# Patient Record
Sex: FEMALE | Race: WHITE | Employment: UNEMPLOYED | URBAN - METROPOLITAN AREA
[De-identification: names, ages, dates, MRNs, and addresses within clinical notes are randomized per-mention and may not be internally consistent; named-entity substitution may affect disease eponyms.]

---

## 2022-06-16 ENCOUNTER — HOSPITAL ENCOUNTER (OUTPATIENT)
Age: 22
Setting detail: SPECIMEN
Discharge: HOME | End: 2022-06-16
Payer: MEDICARE

## 2022-06-16 ENCOUNTER — TELEPHONE (OUTPATIENT)
Dept: OBGYN CLINIC | Age: 22
End: 2022-06-16

## 2022-06-16 ENCOUNTER — OFFICE VISIT (OUTPATIENT)
Dept: OBGYN CLINIC | Age: 22
End: 2022-06-16
Payer: MEDICARE

## 2022-06-16 VITALS
HEART RATE: 70 BPM | HEIGHT: 64 IN | WEIGHT: 132 LBS | SYSTOLIC BLOOD PRESSURE: 112 MMHG | DIASTOLIC BLOOD PRESSURE: 64 MMHG | BODY MASS INDEX: 22.53 KG/M2

## 2022-06-16 DIAGNOSIS — Z87.59 HISTORY OF MISCARRIAGE: ICD-10-CM

## 2022-06-16 DIAGNOSIS — N92.6 MISSED MENSES: Primary | ICD-10-CM

## 2022-06-16 DIAGNOSIS — F12.10 TETRAHYDROCANNABINOL (THC) USE DISORDER, MILD, ABUSE: ICD-10-CM

## 2022-06-16 DIAGNOSIS — N93.9 VAGINA BLEEDING: ICD-10-CM

## 2022-06-16 DIAGNOSIS — R11.2 NAUSEA AND VOMITING, INTRACTABILITY OF VOMITING NOT SPECIFIED, UNSPECIFIED VOMITING TYPE: ICD-10-CM

## 2022-06-16 DIAGNOSIS — N92.6 MISSED MENSES: ICD-10-CM

## 2022-06-16 PROBLEM — A54.9 GONORRHEA: Status: ACTIVE | Noted: 2021-08-24

## 2022-06-16 PROBLEM — A74.9 CHLAMYDIAL INFECTION: Status: ACTIVE | Noted: 2021-08-24

## 2022-06-16 LAB — HCG QUANTITATIVE: ABNORMAL MIU/ML

## 2022-06-16 PROCEDURE — 84702 CHORIONIC GONADOTROPIN TEST: CPT

## 2022-06-16 PROCEDURE — 99203 OFFICE O/P NEW LOW 30 MIN: CPT | Performed by: NURSE PRACTITIONER

## 2022-06-16 PROCEDURE — 36415 COLL VENOUS BLD VENIPUNCTURE: CPT

## 2022-06-16 RX ORDER — VITAMIN A ACETATE, .BETA.-CAROTENE, ASCORBIC ACID, CHOLECALCIFEROL, .ALPHA.-TOCOPHEROL ACETATE, DL-, THIAMINE MONONITRATE, RIBOFLAVIN, NIACINAMIDE, PYRIDOXINE HYDROCHLORIDE, FOLIC ACID, CYANOCOBALAMIN, CALCIUM CARBONATE, FERROUS FUMARATE, ZINC OXIDE, AND CUPRIC OXIDE 2000; 2000; 120; 400; 22; 1.84; 3; 20; 10; 1; 12; 200; 27; 25; 2 [IU]/1; [IU]/1; MG/1; [IU]/1; MG/1; MG/1; MG/1; MG/1; MG/1; MG/1; UG/1; MG/1; MG/1; MG/1; MG/1
1 TABLET ORAL DAILY
Qty: 30 TABLET | Refills: 11 | Status: SHIPPED | OUTPATIENT
Start: 2022-06-16 | End: 2023-06-16

## 2022-06-16 RX ORDER — VITAMIN A ACETATE, .BETA.-CAROTENE, ASCORBIC ACID, CHOLECALCIFEROL, .ALPHA.-TOCOPHEROL ACETATE, DL-, THIAMINE MONONITRATE, RIBOFLAVIN, NIACINAMIDE, PYRIDOXINE HYDROCHLORIDE, FOLIC ACID, CYANOCOBALAMIN, CALCIUM CARBONATE, FERROUS FUMARATE, ZINC OXIDE, AND CUPRIC OXIDE 2000; 2000; 120; 400; 22; 1.84; 3; 20; 10; 1; 12; 200; 27; 25; 2 [IU]/1; [IU]/1; MG/1; [IU]/1; MG/1; MG/1; MG/1; MG/1; MG/1; MG/1; UG/1; MG/1; MG/1; MG/1; MG/1
1 TABLET ORAL DAILY
Qty: 30 TABLET | Refills: 12 | Status: SHIPPED | OUTPATIENT
Start: 2022-06-16 | End: 2022-07-20

## 2022-06-16 RX ORDER — PYRIDOXINE HCL (VITAMIN B6) 50 MG
1 TABLET ORAL 2 TIMES DAILY
Qty: 60 TABLET | Refills: 2 | Status: SHIPPED | OUTPATIENT
Start: 2022-06-16 | End: 2022-07-20

## 2022-06-16 NOTE — PROGRESS NOTES
 Ran Out of Food in the Last Year: Not on file   Transportation Needs:     Lack of Transportation (Medical): Not on file    Lack of Transportation (Non-Medical): Not on file   Physical Activity:     Days of Exercise per Week: Not on file    Minutes of Exercise per Session: Not on file   Stress:     Feeling of Stress : Not on file   Social Connections:     Frequency of Communication with Friends and Family: Not on file    Frequency of Social Gatherings with Friends and Family: Not on file    Attends Bahai Services: Not on file    Active Member of [address] [place] [address] or Organizations: Not on file    Attends Club or Organization Meetings: Not on file    Marital Status: Not on file   Intimate Partner Violence:     Fear of Current or Ex-Partner: Not on file    Emotionally Abused: Not on file    Physically Abused: Not on file    Sexually Abused: Not on file   Housing Stability:     Unable to Pay for Housing in the Last Year: Not on file    Number of [place] in the Last Year: Not on file    Unstable Housing in the Last Year: Not on file         MEDICATIONS:  Current Outpatient Medications   Medication Sig Dispense Refill    Prenatal Vit-Fe Fumarate-FA (PNV PRENATAL PLUS MULTIVITAMIN) 27-1 MG TABS Take 1 tablet by mouth daily 30 tablet 11    Pyridoxine HCl (B-6) 50 MG TABS Take 1 tablet by mouth 2 times daily 60 tablet 2    doxyLAMINE succinate (GNP SLEEP AID) 25 MG tablet Take 1 tablet by mouth nightly for 14 days 14 tablet 0    pantoprazole (PROTONIX) 40 MG tablet Take 1 tablet by mouth daily (Patient not taking: Reported on 6/16/2022) 14 tablet 0     No current facility-administered medications for this visit. ALLERGIES:  Allergies as of 06/16/2022    (No Known Allergies)         REVIEW OF SYSTEMS:    yes   A minimum of an eleven point review of systems was completed. Review Of Systems (11 point):  Constitutional: No fever, chills or malaise;  No weight change or fatigue  Head and Eyes: No vision, Headache, Dizziness or trauma in last 12 months  ENT ROS: No hearing, Tinnitis, sinus or taste problems  Hematological and Lymphatic ROS:No Lymphoma, Von Willebrand's, Hemophillia or Bleeding History  Psych ROS: No Depression, Homicidal thoughts,suicidal thoughts, or anxiety  Breast ROS: No prior breast abnormalities or lumps  Respiratory ROS: No SOB, Pneumoniae,Cough, or Pulmonary Embolism History  Cardiovascular ROS: No Chest Pain with Exertion, Palpitations, Syncope, Edema, Arrhythmia  Gastrointestinal ROS: No Indigestion, Heartburn, Nausea, vomiting, Diarrhea, Constipation,or Bowel Changes; No Bloody Stools or melena  Genito-Urinary ROS: No Dysuria, Hematuria or Nocturia. No Urinary Incontinence or Vaginal Discharge  Musculoskeletal ROS: No Arthralgia, Arthritis,Gout,Osteoporosis or Rheumatism  Neurological ROS: No CVA, Migraines, Epilepsy, Seizure Hx, or Limb Weakness  Dermatological ROS: No Rash, Itching, Hives, Mole Changes or Cancer          Blood pressure 112/64, pulse 70, height 5' 4\" (1.626 m), weight 132 lb (59.9 kg), not currently breastfeeding. Chaperone for Intimate Exam   Chaperone was offered and accepted as part of the rooming process.  Chaperone: Cheryl LUI         Abdomen: Soft non-tender; good bowel sounds. No guarding, rebound or rigidity. No CVA tenderness bilaterally. Extremities: No calf tenderness, DTR 2/4, and No edema bilaterally    Pelvic: Exam deferred. Diagnostics:  No results found. Lab Results:  Results for orders placed or performed during the hospital encounter of 07/29/16   Chlamydia/GC DNA, Urine   Result Value Ref Range    C. trachomatis DNA ,Urine NEGATIVE NEG    N. gonorrhoeae DNA, Urine NEGATIVE NEG         Assessment:   Diagnosis Orders   1. Missed menses  hCG, Quantitative, Pregnancy    CBC with Auto Differential    Prenatal type and screen   2.  Vagina bleeding       Patient Active Problem List    Diagnosis Date Noted    Chlamydial infection 08/24/2021     Priority: Medium    Gonorrhea 08/24/2021     Priority: Medium           PLAN:  Return in about 1 week (around 6/23/2022) for intake . Lab slip given  Rx PNVs, B6 and unisom  Abstain  U/s after + quant above 2000  Repeat Annual every 1 year  Cervical Cytology Evaluation begins at 23years old. If Negative Cytology, Follow-up screening per current guidelines. Return to the office in 1 weeks. Counseled on preventative health maintenance follow-up. Orders Placed This Encounter   Procedures    hCG, Quantitative, Pregnancy     Standing Status:   Future     Standing Expiration Date:   6/16/2023    CBC with Auto Differential     Standing Status:   Future     Standing Expiration Date:   6/16/2023    Prenatal type and screen     Standing Status:   Future     Standing Expiration Date:   6/16/2023           The patient, Silvana Singh is a 24 y.o. female, was seen with a total time spent of 30 minutes for the visit on this date of service by the E/M provider. The time component had both face to face and non face to face time spent in determining the total time component. Counseling and education regarding her diagnosis listed below and her options regarding those diagnoses were also included in determining her time component. Diagnosis Orders   1. Missed menses  hCG, Quantitative, Pregnancy    CBC with Auto Differential    Prenatal type and screen   2. Vagina bleeding          The patient had her preventative health maintenance recommendations and follow-up reviewed with her at the completion of her visit.

## 2022-06-16 NOTE — TELEPHONE ENCOUNTER
Per Alvin Draft NP, pt notified of + quant; unsure of LMP. RX for PNV to be sent to pt pharmacy. Call sent to [facility] to schedule new OB intake and US. Pt verbalized understanding.

## 2022-06-16 NOTE — TELEPHONE ENCOUNTER
----- Message from MONO Castillo NP sent at 6/16/2022  3:54 PM EDT -----  + Gumaro Melendez  Order PNVs  Patient will need scheduled for NOB

## 2022-07-06 ENCOUNTER — APPOINTMENT (OUTPATIENT)
Dept: GENERAL RADIOLOGY | Age: 22
End: 2022-07-06
Payer: MEDICARE

## 2022-07-06 ENCOUNTER — HOSPITAL ENCOUNTER (EMERGENCY)
Age: 22
Discharge: HOME | End: 2022-07-06
Attending: STUDENT IN AN ORGANIZED HEALTH CARE EDUCATION/TRAINING PROGRAM
Payer: MEDICARE

## 2022-07-06 VITALS
BODY MASS INDEX: 24.48 KG/M2 | WEIGHT: 133 LBS | OXYGEN SATURATION: 100 % | TEMPERATURE: 98.3 F | HEART RATE: 101 BPM | SYSTOLIC BLOOD PRESSURE: 120 MMHG | HEIGHT: 62 IN | DIASTOLIC BLOOD PRESSURE: 81 MMHG | RESPIRATION RATE: 16 BRPM

## 2022-07-06 DIAGNOSIS — O21.9 VOMITING DURING PREGNANCY: ICD-10-CM

## 2022-07-06 DIAGNOSIS — R07.9 CHEST PAIN, UNSPECIFIED TYPE: Primary | ICD-10-CM

## 2022-07-06 LAB
ABSOLUTE EOS #: 0.1 K/UL (ref 0–0.4)
ABSOLUTE LYMPH #: 2.2 K/UL (ref 1–4.8)
ABSOLUTE MONO #: 0.8 K/UL (ref 0.1–1.3)
ALBUMIN SERPL-MCNC: 4 G/DL (ref 3.5–5.2)
ALP BLD-CCNC: 65 U/L (ref 35–104)
ALT SERPL-CCNC: 32 U/L (ref 5–33)
ANION GAP SERPL CALCULATED.3IONS-SCNC: 11 MMOL/L (ref 9–17)
AST SERPL-CCNC: 18 U/L
BACTERIA: ABNORMAL
BASOPHILS # BLD: 1 % (ref 0–2)
BASOPHILS ABSOLUTE: 0.1 K/UL (ref 0–0.2)
BILIRUB SERPL-MCNC: 0.2 MG/DL (ref 0.3–1.2)
BILIRUBIN URINE: NEGATIVE
BUN BLDV-MCNC: 7 MG/DL (ref 6–20)
CALCIUM SERPL-MCNC: 9 MG/DL (ref 8.6–10.4)
CASTS UA: ABNORMAL /LPF
CHLORIDE BLD-SCNC: 104 MMOL/L (ref 98–107)
CO2: 22 MMOL/L (ref 20–31)
COLOR: YELLOW
CREAT SERPL-MCNC: <0.4 MG/DL (ref 0.5–0.9)
EOSINOPHILS RELATIVE PERCENT: 1 % (ref 0–4)
EPITHELIAL CELLS UA: ABNORMAL /HPF
GFR AFRICAN AMERICAN: ABNORMAL ML/MIN
GFR NON-AFRICAN AMERICAN: ABNORMAL ML/MIN
GFR SERPL CREATININE-BSD FRML MDRD: ABNORMAL ML/MIN/{1.73_M2}
GLUCOSE BLD-MCNC: 150 MG/DL (ref 70–99)
GLUCOSE URINE: ABNORMAL
HCG QUALITATIVE: POSITIVE
HCT VFR BLD CALC: 42.2 % (ref 36–46)
HEMOGLOBIN: 14.2 G/DL (ref 12–16)
KETONES, URINE: NEGATIVE
LEUKOCYTE ESTERASE, URINE: ABNORMAL
LYMPHOCYTES # BLD: 20 % (ref 24–44)
MAGNESIUM: 2 MG/DL (ref 1.6–2.6)
MCH RBC QN AUTO: 29 PG (ref 26–34)
MCHC RBC AUTO-ENTMCNC: 33.7 G/DL (ref 31–37)
MCV RBC AUTO: 86 FL (ref 80–100)
MONOCYTES # BLD: 7 % (ref 1–7)
NITRITE, URINE: NEGATIVE
PDW BLD-RTO: 14.4 % (ref 11.5–14.9)
PH UA: 6 (ref 5–8)
PLATELET # BLD: 247 K/UL (ref 150–450)
PMV BLD AUTO: 8.9 FL (ref 6–12)
POTASSIUM SERPL-SCNC: 3.5 MMOL/L (ref 3.7–5.3)
PROTEIN UA: NEGATIVE
RBC # BLD: 4.91 M/UL (ref 4–5.2)
RBC UA: ABNORMAL /HPF
SEG NEUTROPHILS: 71 % (ref 36–66)
SEGMENTED NEUTROPHILS ABSOLUTE COUNT: 8 K/UL (ref 1.3–9.1)
SODIUM BLD-SCNC: 137 MMOL/L (ref 135–144)
SPECIFIC GRAVITY UA: 1.02 (ref 1–1.03)
TOTAL PROTEIN: 6.5 G/DL (ref 6.4–8.3)
TROPONIN, HIGH SENSITIVITY: <6 NG/L (ref 0–14)
TURBIDITY: ABNORMAL
URINE HGB: NEGATIVE
UROBILINOGEN, URINE: NORMAL
WBC # BLD: 11.2 K/UL (ref 3.5–11)
WBC UA: ABNORMAL /HPF

## 2022-07-06 PROCEDURE — 80053 COMPREHEN METABOLIC PANEL: CPT

## 2022-07-06 PROCEDURE — 84484 ASSAY OF TROPONIN QUANT: CPT

## 2022-07-06 PROCEDURE — 93005 ELECTROCARDIOGRAM TRACING: CPT | Performed by: STUDENT IN AN ORGANIZED HEALTH CARE EDUCATION/TRAINING PROGRAM

## 2022-07-06 PROCEDURE — 87077 CULTURE AEROBIC IDENTIFY: CPT

## 2022-07-06 PROCEDURE — 87186 SC STD MICRODIL/AGAR DIL: CPT

## 2022-07-06 PROCEDURE — 87086 URINE CULTURE/COLONY COUNT: CPT

## 2022-07-06 PROCEDURE — 81001 URINALYSIS AUTO W/SCOPE: CPT

## 2022-07-06 PROCEDURE — 84703 CHORIONIC GONADOTROPIN ASSAY: CPT

## 2022-07-06 PROCEDURE — 83735 ASSAY OF MAGNESIUM: CPT

## 2022-07-06 PROCEDURE — 36415 COLL VENOUS BLD VENIPUNCTURE: CPT

## 2022-07-06 PROCEDURE — 85025 COMPLETE CBC W/AUTO DIFF WBC: CPT

## 2022-07-06 PROCEDURE — 99285 EMERGENCY DEPT VISIT HI MDM: CPT

## 2022-07-06 PROCEDURE — 71045 X-RAY EXAM CHEST 1 VIEW: CPT

## 2022-07-06 PROCEDURE — 6370000000 HC RX 637 (ALT 250 FOR IP): Performed by: STUDENT IN AN ORGANIZED HEALTH CARE EDUCATION/TRAINING PROGRAM

## 2022-07-06 RX ORDER — DOXYLAMINE SUCCINATE AND PYRIDOXINE HYDROCHLORIDE, DELAYED RELEASE TABLETS 10 MG/10 MG 10; 10 MG/1; MG/1
TABLET, DELAYED RELEASE ORAL
Qty: 60 TABLET | Refills: 0 | Status: SHIPPED | OUTPATIENT
Start: 2022-07-06

## 2022-07-06 RX ORDER — CEPHALEXIN 500 MG/1
500 CAPSULE ORAL 2 TIMES DAILY
Qty: 14 CAPSULE | Refills: 0 | Status: SHIPPED | OUTPATIENT
Start: 2022-07-06 | End: 2022-07-13

## 2022-07-06 RX ORDER — CALCIUM CARBONATE 200(500)MG
1 TABLET,CHEWABLE ORAL DAILY
Qty: 30 TABLET | Refills: 0 | Status: SHIPPED | OUTPATIENT
Start: 2022-07-06 | End: 2022-08-05

## 2022-07-06 RX ORDER — POTASSIUM CHLORIDE 20 MEQ/1
20 TABLET, EXTENDED RELEASE ORAL ONCE
Status: COMPLETED | OUTPATIENT
Start: 2022-07-06 | End: 2022-07-06

## 2022-07-06 RX ADMIN — POTASSIUM CHLORIDE 20 MEQ: 1500 TABLET, EXTENDED RELEASE ORAL at 23:19

## 2022-07-06 ASSESSMENT — PAIN DESCRIPTION - PAIN TYPE: TYPE: ACUTE PAIN

## 2022-07-06 ASSESSMENT — PAIN SCALES - GENERAL: PAINLEVEL_OUTOF10: 7

## 2022-07-06 ASSESSMENT — PAIN - FUNCTIONAL ASSESSMENT: PAIN_FUNCTIONAL_ASSESSMENT: 0-10

## 2022-07-06 ASSESSMENT — PAIN DESCRIPTION - ORIENTATION: ORIENTATION: MID

## 2022-07-06 ASSESSMENT — PAIN DESCRIPTION - LOCATION: LOCATION: CHEST

## 2022-07-07 LAB
EKG ATRIAL RATE: 75 BPM
EKG P AXIS: 38 DEGREES
EKG P-R INTERVAL: 122 MS
EKG Q-T INTERVAL: 364 MS
EKG QRS DURATION: 88 MS
EKG QTC CALCULATION (BAZETT): 406 MS
EKG R AXIS: 62 DEGREES
EKG T AXIS: 52 DEGREES
EKG VENTRICULAR RATE: 75 BPM

## 2022-07-07 PROCEDURE — 93010 ELECTROCARDIOGRAM REPORT: CPT | Performed by: INTERNAL MEDICINE

## 2022-07-07 ASSESSMENT — ENCOUNTER SYMPTOMS
SHORTNESS OF BREATH: 0
RHINORRHEA: 0
VOMITING: 1
SORE THROAT: 0
ABDOMINAL PAIN: 0
NAUSEA: 1
DIARRHEA: 0
COUGH: 0

## 2022-07-07 NOTE — ED PROVIDER NOTES
EMERGENCY DEPARTMENT ENCOUNTER   ATTENDING ATTESTATION     Pt Name: Cody Preston  MRN: [id number]  [place] 2000  Date of evaluation: 7/7/22       Cody Preston is a 24 y.o. female who presents with Chest Pain      MDM:   79-year-old female currently pregnant unknown dates presents for evaluation with burning chest discomfort. Symptoms present for the past several days. Symptoms are mild and intermittent. No other associated symptoms. Has been having some vomiting in the morning during early pregnancy. No shortness of breath. Work-up is unremarkable. Bedside ultrasound performed by resident showed a live IUP. We will have the patient follow-up with OB/GYN. No shortness of breath or hypoxia or respiratory complaints do not suspect PE.    EKG  Sinus rhythm rate of 75 normal axis normal intervals no concerning ST or T wave changes    Vitals:   Vitals:    07/06/22 2105   BP: 120/81   Pulse: (!) 101   Resp: 16   Temp: 98.3 °F (36.8 °C)   TempSrc: Oral   SpO2: 100%   Weight: 133 lb (60.3 kg)   Height: 5' 2\" (1.575 m)         I personally saw and examined the patient. I have reviewed and agree with the resident's findings, including all diagnostic interpretations and treatment plan as written. I was present for the key portions of any procedures performed and the inclusive time noted for any critical care statement. The care is provided during an unprecedented national emergency due to the novel coronavirus, COVID 19.   Radha Sullivan MD  Attending Emergency Physician            Radha Sullivan MD  07/07/22 9992

## 2022-07-07 NOTE — ED NOTES
Mode of arrival (squad #, walk in, police, etc) : walk in      Chief complaint(s): CP      Arrival Note (brief scenario, treatment PTA, etc). : c/o intermittent CP, 2-3 days. Midsternal, no radiation. Unsure if pain is r/t reflux or anxiety. (+) pregnancy 6/16, has pending outpt ultrasound order with Dr. Praveen Knox. Denies abd pain, cramping, discharge and any bleeding. [facility] unknown. No meds for pain PTA. C= \"Have you ever felt that you should Cut down on your drinking? \"  No  A= \"Have people Annoyed you by criticizing your drinking? \"  No  G= \"Have you ever felt bad or Guilty about your drinking? \"  No  E= \"Have you ever had a drink as an Eye-opener first thing in the morning to steady your nerves or to help a hangover? \"  No      Deferred []      Reason for deferring: N/A    *If yes to two or more: probable alcohol abuse. Alexx Hinojosa RN  07/06/22 8851

## 2022-07-07 NOTE — ED PROVIDER NOTES
[facility] ED  Emergency Department Encounter  EmergencyMedicine Resident     Pt Name:Emily Kenyon  MRN: [id number]  [place] 2000  Date of evaluation: 7/6/22  PCP:  Nathaly Castro MD    This patient was evaluated in the Emergency Department for symptoms described in the history of present illness. The patient was evaluated in the context of the global COVID-19 pandemic, which necessitated consideration that the patient might be at risk for infection with the SARS-CoV-2 virus that causes COVID-19. Institutional protocols and algorithms that pertain to the evaluation of patients at risk for COVID-19 are in a state of rapid change based on information released by regulatory bodies including the CDC and federal and state organizations. These policies and algorithms were followed during the patient's care in the ED. CHIEF COMPLAINT       Chief Complaint   Patient presents with    Chest Pain       HISTORY OF PRESENT ILLNESS  (Location/Symptom, Timing/Onset, Context/Setting, Quality, Duration, Modifying Factors, Severity.)      Emily Kenyon is a 24 y.o. female who presents with chest pain and nausea with vomiting. Patient recently found out she was pregnant, is uncertain how far along she is as she does not recall her last menstrual period was. Patient states she has had chest pain for the past 4 days, describes the pain as radiating from her right chest to her left chest, and states it feels like a burning sensation across her chest.  She denies any concurrent shortness of breath. She also endorses several episodes of vomiting in the morning for the past few weeks. She states she has a little nausea now, but has not vomited for the past 2 days. She states she has a history of anxiety, and thinks her chest pain may be due to anxiety, but would like to ensure it is not a cardiac problem. She has an OB appointment scheduled for 7/20 for her initial prenatal visit.   She has been taking prenatal vitamins daily. She has not taken any medication for chest pain or nausea. She denies headache, changes in vision, trauma, syncope or falls, shortness of breath, abdominal pain or cramping, current nausea, vaginal bleeding, vaginal discharge, fevers, chills. PAST MEDICAL / SURGICAL / SOCIAL / FAMILY HISTORY      has a past medical history of Anxiety, Depression, and Depression. has a past surgical history that includes Tonsillectomy and Tympanostomy tube placement. Social History     Socioeconomic History    Marital status: Single     Spouse name: Not on file    Number of children: Not on file    Years of education: Not on file    Highest education level: Not on file   Occupational History    Not on file   Tobacco Use    Smoking status: Never Smoker    Smokeless tobacco: Never Used   Vaping Use    Vaping Use: Some days    Substances: Nicotine    Devices: Disposable   Substance and Sexual Activity    Alcohol use: No    Drug use: Yes     Frequency: 7.0 times per week     Types: Marijuana Cintron Kassandra)    Sexual activity: Not on file   Other Topics Concern    Not on file   Social History Narrative    Not on file     Social Determinants of Health     Financial Resource Strain:     Difficulty of Paying Living Expenses: Not on file   Food Insecurity:     Worried About Running Out of Food in the Last Year: Not on file    Ozzie of Food in the Last Year: Not on file   Transportation Needs:     Lack of Transportation (Medical): Not on file    Lack of Transportation (Non-Medical):  Not on file   Physical Activity:     Days of Exercise per Week: Not on file    Minutes of Exercise per Session: Not on file   Stress:     Feeling of Stress : Not on file   Social Connections:     Frequency of Communication with Friends and Family: Not on file    Frequency of Social Gatherings with Friends and Family: Not on file    Attends Yazidi Services: Not on file   [employer] of [employer]s or Organizations: Not on file    Attends Club or Organization Meetings: Not on file    Marital Status: Not on file   Intimate Partner Violence:     Fear of Current or Ex-Partner: Not on file    Emotionally Abused: Not on file    Physically Abused: Not on file    Sexually Abused: Not on file   Housing Stability:     Unable to Pay for Housing in the Last Year: Not on file    Number of [place] in the Last Year: Not on file    Unstable Housing in the Last Year: Not on file       No family history on file. Allergies:  Patient has no known allergies. Home Medications:  Prior to Admission medications    Medication Sig Start Date End Date Taking? Authorizing Provider   doxylamine-pyridoxine 10-10 MG TBEC Take one tablet daily as needed for nausea 7/6/22  Yes Edgar Cooper MD   calcium carbonate (ANTACID) 500 MG chewable tablet Take 1 tablet by mouth daily 7/6/22 8/5/22 Yes Edgar Cooper MD   cephALEXin [facility]) 500 MG capsule Take 1 capsule by mouth 2 times daily for 7 days 7/6/22 7/13/22 Yes Edgar Cooper MD   Prenatal Vit-Fe Fumarate-FA (PNV PRENATAL PLUS MULTIVITAMIN) 27-1 MG TABS Take 1 tablet by mouth daily 6/16/22 6/16/23  Moris Mix APRN - NP   Pyridoxine HCl (B-6) 50 MG TABS Take 1 tablet by mouth 2 times daily  Patient not taking: Reported on 7/6/2022 6/16/22   Subramanian Imam, APRN - NP   Prenatal Vit-Fe Fumarate-FA (PNV PRENATAL PLUS MULTIVITAMIN) 27-1 MG TABS Take 1 tablet by mouth daily 6/16/22 7/16/22  Moris Mix APRN - NP   pantoprazole (PROTONIX) 40 MG tablet Take 1 tablet by mouth daily  Patient not taking: Reported on 6/16/2022 7/8/16   Regine Tran DO       REVIEW OF SYSTEMS    (2-9 systems for level 4, 10 or more for level 5)      Review of Systems   Constitutional: Negative for activity change, appetite change, chills, fatigue and fever. HENT: Negative for congestion, rhinorrhea and sore throat. Eyes: Negative for visual disturbance. Respiratory: Negative for cough and shortness of breath. Cardiovascular: Positive for chest pain (Right and left sided burning in her chest). Negative for palpitations and leg swelling. Gastrointestinal: Positive for nausea and vomiting (Only in the mornings, none for the past 2 days). Negative for abdominal pain and diarrhea. Genitourinary: Negative for dysuria and hematuria. Musculoskeletal: Negative for arthralgias and myalgias. Skin: Negative for pallor and rash. Neurological: Negative for dizziness, tremors, syncope, weakness, light-headedness, numbness and headaches. All other systems reviewed and are negative. PHYSICAL EXAM   (up to 7 for level 4, 8 or more for level 5)      INITIAL VITALS:   /81   Pulse (!) 101   Temp 98.3 °F (36.8 °C) (Oral)   Resp 16   Ht 5' 2\" (1.575 m)   Wt 133 lb (60.3 kg)   LMP  (LMP Unknown)   SpO2 100%   BMI 24.33 kg/m²     Physical Exam  Vitals reviewed. Constitutional:       General: She is not in acute distress. Appearance: She is not toxic-appearing. HENT:      Head: Normocephalic and atraumatic. Mouth/Throat:      Mouth: Mucous membranes are moist.      Pharynx: Oropharynx is clear. Eyes:      Extraocular Movements: Extraocular movements intact. Pupils: Pupils are equal, round, and reactive to light. Cardiovascular:      Rate and Rhythm: Normal rate and regular rhythm. Pulses: Normal pulses. Heart sounds: Normal heart sounds. Pulmonary:      Effort: Pulmonary effort is normal.      Breath sounds: Normal breath sounds. No decreased breath sounds, wheezing, rhonchi or rales. Abdominal:      Palpations: Abdomen is soft. Tenderness: There is no abdominal tenderness. There is no guarding or rebound. Musculoskeletal:         General: Normal range of motion. Cervical back: Normal range of motion and neck supple. Right lower leg: No edema. Left lower leg: No edema.    Skin:     Capillary Refill: Capillary refill takes less than 2 seconds. Coloration: Skin is not pale. Findings: No rash. Neurological:      General: No focal deficit present. Mental Status: She is alert and oriented to person, place, and time. Motor: No weakness. DIFFERENTIAL  DIAGNOSIS     PLAN (LABS / IMAGING / EKG):  Orders Placed This Encounter   Procedures    Culture, Urine    XR CHEST PORTABLE    Troponin    HCG Qualitative, Serum    CBC with Auto Differential    Comprehensive Metabolic Panel w/ Reflex to MG    Urinalysis with Microscopic    Magnesium    EKG 12 Lead       MEDICATIONS ORDERED:  Orders Placed This Encounter   Medications    potassium chloride (KLOR-CON M) extended release tablet 20 mEq    doxylamine-pyridoxine 10-10 MG TBEC     Sig: Take one tablet daily as needed for nausea     Dispense:  60 tablet     Refill:  0    calcium carbonate (ANTACID) 500 MG chewable tablet     Sig: Take 1 tablet by mouth daily     Dispense:  30 tablet     Refill:  0    cephALEXin (KEFLEX) 500 MG capsule     Sig: Take 1 capsule by mouth 2 times daily for 7 days     Dispense:  14 capsule     Refill:  0       DDX: Chest Pain Diferentials    Emergent causes: ACS/NSTEMI/STEMI/angina, arrhythmia, trauma, aortic dissection, PE, Pneumonia, pneumothorax, esophageal rupture, tamponade, Cocaine use    Non-emergent causes: pneumonia, pericarditis, GERD, MSK, Endocarditis, anxiety      As a part of this differential, I evaluated for the presence of diaphoresis,  chest pain (including the QRST of the chest pain), tachypnea, BP in both arms, heart sounds, JVD, chest wall tenderness, abnormal lung sounds, and the presence of systemic and peripheral edema. I also evaluated the HEART Risk score and it is documented below.     HEART Risk Score for Chest Pain Patients      History and Physical Exam Suspicion Level   · Nausea/Vomiting   · Diaphoresis   · Radiation   · Related to Exertion  · Quality of Pain     EKG Interpretation  · Normal (0 pts)  · Non-Specific Repolarization Disturbance (1 pt)  · Significant ST-Depression (2 pts)     Age of Patient (in years)  · = 45 (0 pts)  · 46-64 (1 pt)  · = 65 (2 pts)    Risk Factors (number present)  · Hypercholesterolemia  · Hypertension  · Diabetes Mellitus  · Cigarette smoking  · Positive family history  · Obesity  · CAD  · Automatically get 2 points for - SLE, CKDz, HIV, Cocaine abuse     Troponin Levels  · = Normal Limit (0 pts)  · 1-3 Times Normal Limit (1 pt)  · > 3 Times Normal Limit (2 pts)      H&P ECG  Patient Age Risk Factors Troponins   0   Slight Normal   45   None   Normal level   1   Moderate Non-specific    46-64   1-2 risk factors   1-3 x Normal   2   High ST Changes   65 or older   3+ risk factors   3+ x Normal   Total 0 0 0 0 0     TOTAL: 0    Percent Risk for Major Adverse Cardiac Event (MACE)  0-3 pts indicates low risk for MACE   2.5% (DISCHARGE)   4-7 pts indicates moderate risk for MACE  20.3% (OBS)  8-10 pts indicates high risk for MACE   72.7% (EARLY INVASIVE TX)      DIAGNOSTIC RESULTS / EMERGENCY DEPARTMENT COURSE / MDM   LAB RESULTS:  Results for orders placed or performed during the hospital encounter of 07/06/22   Troponin   Result Value Ref Range    Troponin, High Sensitivity <6 0 - 14 ng/L   HCG Qualitative, Serum   Result Value Ref Range    hCG Qual POSITIVE (A) NEGATIVE   CBC with Auto Differential   Result Value Ref Range    WBC 11.2 (H) 3.5 - 11.0 k/uL    RBC 4.91 4.0 - 5.2 m/uL    Hemoglobin 14.2 12.0 - 16.0 g/dL    Hematocrit 42.2 36 - 46 %    MCV 86.0 80 - 100 fL    MCH 29.0 26 - 34 pg    MCHC 33.7 31 - 37 g/dL    RDW 14.4 11.5 - 14.9 %    Platelets 166 715 - 626 k/uL    MPV 8.9 6.0 - 12.0 fL    Seg Neutrophils 71 (H) 36 - 66 %    Lymphocytes 20 (L) 24 - 44 %    Monocytes 7 1 - 7 %    Eosinophils % 1 0 - 4 %    Basophils 1 0 - 2 %    Segs Absolute 8.00 1.3 - 9.1 k/uL    Absolute Lymph # 2.20 1.0 - 4.8 k/uL    Absolute Mono # 0.80 0.1 - 1.3 k/uL has her initial prenatal visit scheduled for 7/20. EKG NSR. Troponin < 6. WBC 11.2 consistent with patient's reported dehydration. Glucose 150, discussed the need for early 1 hr GTT and prenatal follow ups with her OB to rule out gestational diabetes. K 3.5, replaced. CXR showing no acute cardiopulmonary abnormality. Lungs CTA, heart RRR, abdomen soft and nontender in all quadrants. Bedside ultrasound showing fetal . UA contaminated sample, but did show trace leukocyte esterase, 3-5 WBC and few bacteria. Will treat with Keflex. Discussed normal cardiac workup with patient, who feels she may have GERD symptoms. She is also requesting anti nausea medications for her recurrent morning sickness. Will prescribe calcium carbonate and diclegus. Patient scheduled for follow up on 7/20 with OB, discussed return precautions and advised patient to return to the ED with any worsening symptoms prior to her appointment. Patient and mother voiced understanding and agreement with plan, and all questions were answered. RADIOLOGY:  XR CHEST PORTABLE    Result Date: 7/6/2022  EXAMINATION: ONE XRAY VIEW OF THE CHEST 7/6/2022 9:39 pm COMPARISON: None. HISTORY: ORDERING SYSTEM PROVIDED HISTORY: cp, sob. r/o infiltrate, pleural effusion, pulm edema TECHNOLOGIST PROVIDED HISTORY: cp, sob. r/o infiltrate, pleural effusion, pulm edema Reason for Exam: Chest pain Additional signs and symptoms: Chest pain Relevant Medical/Surgical History: Chest pain FINDINGS: Chest radiograph: The cardiomediastinal silhouette and hilar contours are normal. The lungs are clear with no focal consolidation, pleural effusion or pneumothorax. The overlying soft tissue and osseous structures do not demonstrate acute abnormality. No acute intrathoracic pathology. EKG  EKG: normal EKG, normal sinus rhythm.       All EKG's are interpreted by the Emergency Department Physician who either signs or Co-signs this chart in the absence of a cardiologist.    EMERGENCY DEPARTMENT COURSE:  ED Course as of 07/07/22 0029 Wed Jul 06, 2022 2207 WBC(!): 11.2 [JG]   2222 GLUCOSE, FASTING,GF(!): 150 [JG]   2223 Potassium(!): 3.5 [JG]   2228 CXR - no acute cardiopulmonary abnormality [JG]   2257 Bedside ultrasound showing fetal  [JG]   2259 Urine contaminated sample, 6-9 epithelial cells/hpf. No symptoms of dysuria, hematuria, flank pain, urgency, frequency. Will not treat as UTI at this time. Patient has close follow up with OB scheduled on 7/20 and will re-address at that time. Urine culture pending [JG]      ED Course User Index  [JG] Bharath Pruitt MD     CONSULTS:  None    FINAL IMPRESSION      1. Chest pain, unspecified type    2.  Vomiting during pregnancy          DISPOSITION / PLAN     DISPOSITION Decision To Discharge 07/06/2022 11:01:10 PM      PATIENT REFERRED TO:  [facility]  [address]  [facility] [id number] [phone]  Schedule an appointment as soon as possible for a visit in 3 days      [facility] ED  [address] [id number] [phone]  Go to   As needed, If symptoms worsen      DISCHARGE MEDICATIONS:  Discharge Medication List as of 7/6/2022 11:14 PM      START taking these medications    Details   doxylamine-pyridoxine 10-10 MG TBEC Take one tablet daily as needed for nausea, Disp-60 tablet, R-0Normal      calcium carbonate (ANTACID) 500 MG chewable tablet Take 1 tablet by mouth daily, Disp-30 tablet, R-0Normal      cephALEXin (KEFLEX) 500 MG capsule Take 1 capsule by mouth 2 times daily for 7 days, Disp-14 capsule, R-0Normal             Bharath Pruitt MD  Emergency Medicine Resident    (Please note that portions of thisnote were completed with a voice recognition program.  Efforts were made to edit the dictations but occasionally words are mis-transcribed.)       Bharath Pruitt MD  Resident  07/07/22 0030

## 2022-07-07 NOTE — ED NOTES
The following labs labeled with pt sticker and tubed to lab:     [] Blue     [x] Lavender   [] on ice  [x] Green/yellow  [] Green/black [] on ice  [x] Yellow  [] Red       Obdulia Farley RN  07/06/22 5457

## 2022-07-08 LAB
CULTURE: ABNORMAL
SPECIMEN DESCRIPTION: ABNORMAL

## 2022-07-20 ENCOUNTER — INITIAL PRENATAL (OUTPATIENT)
Dept: OBGYN CLINIC | Age: 22
End: 2022-07-20
Payer: MEDICARE

## 2022-07-20 ENCOUNTER — PROCEDURE VISIT (OUTPATIENT)
Dept: OBGYN CLINIC | Age: 22
End: 2022-07-20
Payer: MEDICARE

## 2022-07-20 VITALS
WEIGHT: 138 LBS | BODY MASS INDEX: 25.24 KG/M2 | DIASTOLIC BLOOD PRESSURE: 60 MMHG | SYSTOLIC BLOOD PRESSURE: 98 MMHG | HEART RATE: 76 BPM

## 2022-07-20 DIAGNOSIS — O36.80X0 PREGNANCY WITH INCONCLUSIVE FETAL VIABILITY, SINGLE OR UNSPECIFIED FETUS: Primary | ICD-10-CM

## 2022-07-20 DIAGNOSIS — Z3A.11 11 WEEKS GESTATION OF PREGNANCY: ICD-10-CM

## 2022-07-20 DIAGNOSIS — O09.91 SUPERVISION OF HIGH RISK PREGNANCY IN FIRST TRIMESTER: Primary | ICD-10-CM

## 2022-07-20 DIAGNOSIS — Z34.90 EARLY STAGE OF PREGNANCY: Primary | ICD-10-CM

## 2022-07-20 LAB
CRL: NORMAL
SAC DIAMETER: NORMAL

## 2022-07-20 PROCEDURE — H1000 PRENATAL CARE ATRISK ASSESSM: HCPCS | Performed by: NURSE PRACTITIONER

## 2022-07-20 PROCEDURE — 76801 OB US < 14 WKS SINGLE FETUS: CPT | Performed by: OBSTETRICS & GYNECOLOGY

## 2022-07-20 RX ORDER — DOXYLAMINE SUCCINATE 25 MG/1
TABLET ORAL
COMMUNITY
Start: 2022-06-16 | End: 2022-09-23

## 2022-07-20 NOTE — PROGRESS NOTES
Patient presents to office for OB intake, nurse visit only. Intake completed following ultrasound in office to confirm pregnancy dating/viability. Based on ultrasound, patient is currently 11w6d  gestation, Estimated Date of Delivery: 2/2/23. Patient presents to intake FOB. This was an unplanned pregnancy. Patient currently taking has a current medication list which includes the following prescription(s): unisom sleeptabs, doxylamine-pyridoxine, calcium carbonate, and pnv prenatal plus multivitamin. . Patient's medical, surgical, obstetrical and family history reviewed. See HER for details. Patient prenatal lab work given to patient at this visit as well as OB education material. 1hr GTT added to OB lab work due to family hx DM in Mother. New OB consent forms signed. Patient accepted first trimester screening. Cystic Fibrosis screening ordered . Referral placed to [facility] for first trimester screen. Patient scheduled for follow up OB appointment with Denice Gnozalez 8/24/22. Patient instructed to complete lab work prior to follow up OB appointment.

## 2022-08-03 ENCOUNTER — HOSPITAL ENCOUNTER (OUTPATIENT)
Age: 22
Setting detail: SPECIMEN
Discharge: HOME | End: 2022-08-03

## 2022-08-03 ENCOUNTER — ROUTINE PRENATAL (OUTPATIENT)
Dept: OBGYN CLINIC | Age: 22
End: 2022-08-03
Payer: MEDICARE

## 2022-08-03 VITALS
HEART RATE: 96 BPM | DIASTOLIC BLOOD PRESSURE: 64 MMHG | WEIGHT: 139 LBS | BODY MASS INDEX: 25.42 KG/M2 | SYSTOLIC BLOOD PRESSURE: 110 MMHG

## 2022-08-03 DIAGNOSIS — Z3A.13 13 WEEKS GESTATION OF PREGNANCY: Primary | ICD-10-CM

## 2022-08-03 DIAGNOSIS — Z83.3 FAMILY HISTORY OF DIABETES MELLITUS: ICD-10-CM

## 2022-08-03 DIAGNOSIS — Z3A.13 13 WEEKS GESTATION OF PREGNANCY: ICD-10-CM

## 2022-08-03 PROCEDURE — 99214 OFFICE O/P EST MOD 30 MIN: CPT | Performed by: NURSE PRACTITIONER

## 2022-08-03 NOTE — PROGRESS NOTES
Sanjay Santillan  8/3/2022    YOB: 2000    8/3/2022   No LMP recorded (lmp unknown). Patient is pregnant. 13w6d        Primary Care Physician: Yara Mcgarry MD        CC: Initial Prenatal Visit    Subjective:     Sanjay Santillan is a 24 y.o. female Michi Holloway    is being seen today for her first obstetrical visit. This is a planned pregnancy. She is at 13w6d  Her obstetrical history is significant for family hx of diabetes, hx of SAB. Relationship with FOB: significant other, living together. Patient does intend to breast feed. Pregnancy history fully reviewed. Mother's ethnicity:      Objective:   Blood pressure 110/64, pulse 96, weight 139 lb (63 kg), not currently breastfeeding.     OB History    Para Term  AB Living   2 0 0 0 1 0   SAB IAB Ectopic Molar Multiple Live Births   1 0 0 0 0 0      # Outcome Date GA Lbr Zbigniew/2nd Weight Sex Delivery Anes PTL Lv   2 Current            1 SAB 2021               Past Medical History:   Diagnosis Date    Anxiety     Depression     Depression      Past Surgical History:   Procedure Laterality Date    TONSILLECTOMY      TYMPANOSTOMY TUBE PLACEMENT        Social History     Socioeconomic History    Marital status: Single     Spouse name: Not on file    Number of children: Not on file    Years of education: Not on file    Highest education level: Not on file   Occupational History    Not on file   Tobacco Use    Smoking status: Never    Smokeless tobacco: Never   Vaping Use    Vaping Use: Former    Devices: Disposable   Substance and Sexual Activity    Alcohol use: No    Drug use: Yes     Types: Marijuana Valdene Tian)    Sexual activity: Yes     Partners: Male   Other Topics Concern    Not on file   Social History Narrative    Not on file     Social Determinants of Health     Financial Resource Strain: Not on file   Food Insecurity: Not on file   Transportation Needs: Not on file   Physical Activity: Not on file   Stress: Not on file   Social Connections: Not on file   Intimate Partner Violence: Not on file   Housing Stability: Not on file     Family History   Problem Relation Age of Onset    Hypertension Maternal Grandmother     Diabetes Maternal Grandmother     Hypertension Maternal Grandfather     Heart Disease Maternal Grandfather     Diabetes Maternal Grandfather     Diabetes Mother        MEDICATIONS:  Current Outpatient Medications   Medication Sig Dispense Refill    UNISOM SLEEPTABS 25 MG tablet take 1 tablet by mouth nightly for 14 days      doxylamine-pyridoxine 10-10 MG TBEC Take one tablet daily as needed for nausea 60 tablet 0    calcium carbonate (ANTACID) 500 MG chewable tablet Take 1 tablet by mouth daily 30 tablet 0    Prenatal Vit-Fe Fumarate-FA (PNV PRENATAL PLUS MULTIVITAMIN) 27-1 MG TABS Take 1 tablet by mouth daily 30 tablet 11     No current facility-administered medications for this visit. ALLERGIES:  Allergies as of 08/03/2022    (No Known Allergies)           Physical Exam Completed-See Epic Navigator    Chaperone for Intimate Exam   Chaperone was offered and accepted as part of the rooming process.  Chaperone: Rupal             Assessment:      Pregnancy at 15 and 6/7 weeks    Diagnosis Orders   1. 13 weeks gestation of pregnancy  C.trachomatis N.gonorrhoeae DNA    Culture, Genital    PAP SMEAR      2. Family history of diabetes mellitus                Patient Active Problem List    Diagnosis Date Noted    Family history of diabetes mellitus 08/03/2022     Priority: High     Early 1 hour GTT ordered      History of miscarriage 06/16/2022     Priority: High    THC use 06/16/2022     Priority: Medium    Chlamydial infection 08/24/2021     Priority: Medium    Gonorrhea 08/24/2021     Priority: Medium                    Plan:      Initial labs drawn. Prenatal vitamins. RTO in 4 weeks. Previously referred for first trimester screen.   Initial labs reprinted. Problem list reviewed and updated. NT Screen/Quad Testing and MSAFP Single Marker: requested  Role of ultrasound in pregnancy discussed; fetal survey: requests  Amniocentesis discussed: Declined  NIPT Testing not indicated  Cultures & Wet Prep Collected  Follow-up in 4 weeks  Repeat Annual every 1 year  Cervical Cytology Evaluation begins at 24years old. If Negative Cytology, Follow-up screening per current guidelines. [facility] appointment scheduled      COUNSELING COMPLETED:  INITIAL OBSTETRICAL VISIT EVALUATION:  The patient was seen full history and physical was completed/reviewed. Cytology was collected for patients over 24years of age. Cultures were collected. The patient was counseled on office policies and she was counseled on termination of pregnancy in the state of [place]. The patient was counseled on Toxoplasmosis, HIV, Tobacco Abuse, Group Beta Strep Infections, Cystic Fibrosis,  Labor precautions and Sickle Cell disease. The patient was counseled on the risks of tobacco abuse. Both maternal and fetal. She was instructed to stop smoking if currently using tobacco. Morbidity, mortality, and cessation programs were reviewed. The risks include but are not limited to increased risks of  labor,  delivery, premature rupture of membranes, intrauterine growth restriction, intrauterine fetal demise and abruptio placenta. Secondary smoke risks were also reviewed. Increases in cancer, respiratory problems, and sudden infant death syndrome were reviewed as well. The patient was informed of a 2-4% risk of congenital anomalies in the general population. She was also informed that karyotyping is the only way to evaluate the fetus for genetic problems and genetic lethal anomalies. Chorionic villous sampling, amniocentesis and NIPT testing were also discussed with morbidity rates in detail. She declined the procedure.     Route of delivery and counseling on vaginal, operative vaginal, and  sections were completed with the risks of each to both the patient as well as her baby. The possibility of a blood transfusion was discussed as well. The patient was not opposed to receiving a transfusion if needed. Nuchal translucency/Quad Evaluation and MSAFP single marker testing was reviewed in detail with attention to timing of testing and their windows. For patients beyond the gestational age for Nuchal translucency evaluation Quad testing was recommended. Timing for the Quad test was reviewed. Benefits of the above testing was reviewed. A second trimester amniocentesis was also made available to the patient. Risks, Benefits and non-invasive alternative testing was reviewed. The literature regarding a questionable link to pitocin augmentation and induction of labor, the assistance of labor contractions and the initiation of contractions to help delivery, have been reviewed with the patient regarding the increased potential of having a  with Attention Deficit Hyperactivity Disorder and or Autism. These two disorders and the ramifications of their impact on a child and the family caring for that child has been reviewed with the patient in detail. She was given the risks, benefits and alternatives of the use of this medication. She has agreed to its use in the delivery of her unborn child if needed at the time of delivery, Yes. The patient was questioned in detail regarding any genetic misnomer history, chromosomal abnormalities, or learning disabilities in  herself, the father of the baby or their families. SHE DENIED ANY HISTORY AS STATED ABOVE: Yes    Upon completion of the visit all questions were answered and the patients follow-up and testing schedule were reviewed. Prenatal vitamins were given. T-dap Vaccine recommendations reviewed with the patient. Patient notified of timing of vaccination 27-36 weeks gestation. Patient aware Vaccine is NOT Live. Yes.            The patient, Esequiel Valdez is a 24 y.o. female, was seen with a total time spent of 30 minutes for the visit on this date of service by the E/M provider. The time component had both face to face and non face to face time spent in determining the total time component. Counseling and education regarding her diagnosis listed below and her options regarding those diagnoses were also included in determining her time component. Diagnosis Orders   1. 13 weeks gestation of pregnancy  C.trachomatis N.gonorrhoeae DNA    Culture, Genital    PAP SMEAR      2. Family history of diabetes mellitus             The patient had her preventative health maintenance recommendations and follow-up reviewed with her at the completion of her visit.

## 2022-08-04 ENCOUNTER — HOSPITAL ENCOUNTER (OUTPATIENT)
Age: 22
Discharge: HOME | End: 2022-08-04
Payer: MEDICARE

## 2022-08-04 ENCOUNTER — ROUTINE PRENATAL (OUTPATIENT)
Dept: PERINATAL CARE | Age: 22
End: 2022-08-04
Payer: MEDICARE

## 2022-08-04 ENCOUNTER — TELEPHONE (OUTPATIENT)
Dept: OBGYN CLINIC | Age: 22
End: 2022-08-04

## 2022-08-04 VITALS
HEART RATE: 79 BPM | WEIGHT: 139 LBS | DIASTOLIC BLOOD PRESSURE: 60 MMHG | BODY MASS INDEX: 25.58 KG/M2 | TEMPERATURE: 98.1 F | HEIGHT: 62 IN | SYSTOLIC BLOOD PRESSURE: 104 MMHG | RESPIRATION RATE: 16 BRPM

## 2022-08-04 DIAGNOSIS — Z36.9 FIRST TRIMESTER SCREENING: Primary | ICD-10-CM

## 2022-08-04 DIAGNOSIS — Z3A.14 14 WEEKS GESTATION OF PREGNANCY: ICD-10-CM

## 2022-08-04 DIAGNOSIS — Z34.90 EARLY STAGE OF PREGNANCY: ICD-10-CM

## 2022-08-04 DIAGNOSIS — O99.810 ABNORMAL GLUCOSE AFFECTING PREGNANCY: Primary | ICD-10-CM

## 2022-08-04 DIAGNOSIS — O36.80X0 ENCOUNTER TO DETERMINE FETAL VIABILITY OF PREGNANCY, SINGLE OR UNSPECIFIED FETUS: ICD-10-CM

## 2022-08-04 DIAGNOSIS — Z3A.11 11 WEEKS GESTATION OF PREGNANCY: ICD-10-CM

## 2022-08-04 PROBLEM — R73.09 ABNORMAL GTT (GLUCOSE TOLERANCE TEST): Status: ACTIVE | Noted: 2022-08-04

## 2022-08-04 LAB
ABO/RH: NORMAL
ABSOLUTE EOS #: 0.03 K/UL (ref 0–0.44)
ABSOLUTE IMMATURE GRANULOCYTE: 0.03 K/UL (ref 0–0.3)
ABSOLUTE LYMPH #: 1.4 K/UL (ref 1.1–3.7)
ABSOLUTE MONO #: 0.41 K/UL (ref 0.1–1.2)
ANTIBODY SCREEN: NEGATIVE
BASOPHILS # BLD: 1 % (ref 0–2)
BASOPHILS ABSOLUTE: 0.04 K/UL (ref 0–0.2)
BILIRUBIN URINE: NEGATIVE
C TRACH DNA GENITAL QL NAA+PROBE: NEGATIVE
CASTS UA: NORMAL /LPF (ref 0–8)
COLOR: YELLOW
CRL: NORMAL
EOSINOPHILS RELATIVE PERCENT: 0 % (ref 1–4)
EPITHELIAL CELLS UA: NORMAL /HPF (ref 0–5)
GLUCOSE ADMINISTRATION: NORMAL
GLUCOSE TOLERANCE SCREEN 50G: 134 MG/DL (ref 70–135)
GLUCOSE URINE: NEGATIVE
HCT VFR BLD CALC: 45.4 % (ref 36.3–47.1)
HEMOGLOBIN: 15.4 G/DL (ref 11.9–15.1)
HEPATITIS B SURFACE ANTIGEN: NONREACTIVE
IMMATURE GRANULOCYTES: 0 %
KETONES, URINE: NEGATIVE
LEUKOCYTE ESTERASE, URINE: NEGATIVE
LYMPHOCYTES # BLD: 17 % (ref 24–43)
MCH RBC QN AUTO: 30.1 PG (ref 25.2–33.5)
MCHC RBC AUTO-ENTMCNC: 33.9 G/DL (ref 28.4–34.8)
MCV RBC AUTO: 88.7 FL (ref 82.6–102.9)
MONOCYTES # BLD: 5 % (ref 3–12)
N. GONORRHOEAE DNA: NEGATIVE
NITRITE, URINE: NEGATIVE
NRBC AUTOMATED: 0 PER 100 WBC
PDW BLD-RTO: 13.8 % (ref 11.8–14.4)
PH UA: 8.5 (ref 5–8)
PLATELET # BLD: 246 K/UL (ref 138–453)
PMV BLD AUTO: 11.3 FL (ref 8.1–13.5)
PROTEIN UA: NEGATIVE
RBC # BLD: 5.12 M/UL (ref 3.95–5.11)
RBC UA: NORMAL /HPF (ref 0–4)
RUBV IGG SER QL: 46.8 IU/ML
SAC DIAMETER: NORMAL
SEG NEUTROPHILS: 77 % (ref 36–65)
SEGMENTED NEUTROPHILS ABSOLUTE COUNT: 6.52 K/UL (ref 1.5–8.1)
SPECIFIC GRAVITY UA: 1.01 (ref 1–1.03)
SPECIMEN DESCRIPTION: NORMAL
TSH SERPL DL<=0.05 MIU/L-ACNC: 0.8 UIU/ML (ref 0.3–5)
TURBIDITY: ABNORMAL
URINE HGB: NEGATIVE
UROBILINOGEN, URINE: NORMAL
WBC # BLD: 8.4 K/UL (ref 3.5–11.3)
WBC UA: NORMAL /HPF (ref 0–5)

## 2022-08-04 PROCEDURE — 86901 BLOOD TYPING SEROLOGIC RH(D): CPT

## 2022-08-04 PROCEDURE — 76813 OB US NUCHAL MEAS 1 GEST: CPT | Performed by: OBSTETRICS & GYNECOLOGY

## 2022-08-04 PROCEDURE — 81508 FTL CGEN ABNOR TWO PROTEINS: CPT

## 2022-08-04 PROCEDURE — 84443 ASSAY THYROID STIM HORMONE: CPT

## 2022-08-04 PROCEDURE — 36415 COLL VENOUS BLD VENIPUNCTURE: CPT

## 2022-08-04 PROCEDURE — 81220 CFTR GENE COM VARIANTS: CPT

## 2022-08-04 PROCEDURE — 86900 BLOOD TYPING SEROLOGIC ABO: CPT

## 2022-08-04 PROCEDURE — 99999 PR OFFICE/OUTPT VISIT,PROCEDURE ONLY: CPT | Performed by: OBSTETRICS & GYNECOLOGY

## 2022-08-04 PROCEDURE — 87389 HIV-1 AG W/HIV-1&-2 AB AG IA: CPT

## 2022-08-04 PROCEDURE — 85025 COMPLETE CBC W/AUTO DIFF WBC: CPT

## 2022-08-04 PROCEDURE — 76801 OB US < 14 WKS SINGLE FETUS: CPT | Performed by: OBSTETRICS & GYNECOLOGY

## 2022-08-04 PROCEDURE — 86762 RUBELLA ANTIBODY: CPT

## 2022-08-04 PROCEDURE — 81001 URINALYSIS AUTO W/SCOPE: CPT

## 2022-08-04 PROCEDURE — 87340 HEPATITIS B SURFACE AG IA: CPT

## 2022-08-04 PROCEDURE — 86780 TREPONEMA PALLIDUM: CPT

## 2022-08-04 PROCEDURE — 86850 RBC ANTIBODY SCREEN: CPT

## 2022-08-04 PROCEDURE — 82950 GLUCOSE TEST: CPT

## 2022-08-04 NOTE — TELEPHONE ENCOUNTER
Per Amanda Bhatia pt notified of abnormal 1 hr GTT and pt to complete 3hr GTT/ Hgb A1c. Orders in epic.

## 2022-08-04 NOTE — TELEPHONE ENCOUNTER
----- Message from MONO Lane NP sent at 8/4/2022  4:08 PM EDT -----  Elevated 1 hour GTT  Please order 3 hour GTT and HgbA1c

## 2022-08-05 PROBLEM — Z67.91 RH NEGATIVE STATE IN ANTEPARTUM PERIOD: Status: ACTIVE | Noted: 2022-08-05

## 2022-08-05 PROBLEM — O26.899 RH NEGATIVE STATE IN ANTEPARTUM PERIOD: Status: ACTIVE | Noted: 2022-08-05

## 2022-08-05 LAB
HIV AG/AB: NONREACTIVE
T. PALLIDUM, IGG: NONREACTIVE

## 2022-08-07 LAB
CULTURE: NORMAL
SPECIMEN DESCRIPTION: NORMAL

## 2022-08-10 LAB — CYSTIC FIBROSIS: NORMAL

## 2022-08-11 LAB — CYTOLOGY REPORT: NORMAL

## 2022-08-23 LAB
AFP INTERPRETATION: NORMAL
CRL: 76.3 MM
CROWN RUMP LENGTH TWIN B: NORMAL MM
CROWN RUMP LENGTH: 76.3
DATE OF BIRTH: NORMAL
DONOR EGG?: NORMAL
GESTATIONAL AGE: NORMAL
HISTORY OF ANEUPLOIDY?: NORMAL
IN VITRO FERTILIZATION: NORMAL
MATERNAL AGE AT EDD: 23.2 YR
MATERNAL SCREEN, EER: NORMAL
MATERNAL WEIGHT: 139
MOM FOR NT, TWIN B: NORMAL
MOM FOR NT: 0.98
MOM FOR PAPP-A: 0.95
MONOCHORIONIC TWINS: NORMAL
MSHCG-MOM: 0.43
MSHCG: NORMAL IU/L
NUCHAL TRANSLUC (NT): 1.8
NUCHAL TRANSLUC (NT): 1.8 MM
NUCHAL TRANSLUCENCY TWIN B: NORMAL MM
NUMBER OF FETUSES: 1
NUMBER OF FETUSES: NORMAL
PAPP-A MOM: 1447.7 NG/ML
PATIENT WEIGHT UNITS: NORMAL
PATIENT WEIGHT: NORMAL
PREV TRISOMY PREG: NORMAL
RACE (MATERNAL): NORMAL
RACE: NORMAL
READING MD CERT NUM: NORMAL
READING MD NAME: NORMAL
REPEAT SPECIMEN?: NORMAL
SMOKING: NEGATIVE
SMOKING: NO
SONOGRAPHER CERT NO: NORMAL
SONOGRAPHER CERT NO: NORMAL
SONOGRAPHER NAME: NORMAL
SONOGRAPHER NAME: NORMAL
SPECIMEN: NORMAL
ULTRASOUND DATE: NORMAL
ULTRASOUND DATE: NORMAL

## 2022-08-31 ENCOUNTER — ROUTINE PRENATAL (OUTPATIENT)
Dept: OBGYN CLINIC | Age: 22
End: 2022-08-31
Payer: MEDICARE

## 2022-08-31 VITALS
WEIGHT: 148.6 LBS | SYSTOLIC BLOOD PRESSURE: 114 MMHG | BODY MASS INDEX: 27.18 KG/M2 | DIASTOLIC BLOOD PRESSURE: 70 MMHG | HEART RATE: 74 BPM

## 2022-08-31 DIAGNOSIS — O26.899 RH NEGATIVE STATE IN ANTEPARTUM PERIOD: ICD-10-CM

## 2022-08-31 DIAGNOSIS — Z87.59 HISTORY OF MISCARRIAGE: ICD-10-CM

## 2022-08-31 DIAGNOSIS — Z83.3 FAMILY HISTORY OF DIABETES MELLITUS: ICD-10-CM

## 2022-08-31 DIAGNOSIS — R73.09 ABNORMAL GTT (GLUCOSE TOLERANCE TEST): ICD-10-CM

## 2022-08-31 DIAGNOSIS — Z3A.17 17 WEEKS GESTATION OF PREGNANCY: Primary | ICD-10-CM

## 2022-08-31 DIAGNOSIS — Z67.91 RH NEGATIVE STATE IN ANTEPARTUM PERIOD: ICD-10-CM

## 2022-08-31 PROCEDURE — 99213 OFFICE O/P EST LOW 20 MIN: CPT | Performed by: NURSE PRACTITIONER

## 2022-08-31 PROCEDURE — 1036F TOBACCO NON-USER: CPT | Performed by: NURSE PRACTITIONER

## 2022-08-31 PROCEDURE — G8427 DOCREV CUR MEDS BY ELIG CLIN: HCPCS | Performed by: NURSE PRACTITIONER

## 2022-08-31 PROCEDURE — G8419 CALC BMI OUT NRM PARAM NOF/U: HCPCS | Performed by: NURSE PRACTITIONER

## 2022-08-31 SDOH — ECONOMIC STABILITY: FOOD INSECURITY: WITHIN THE PAST 12 MONTHS, YOU WORRIED THAT YOUR FOOD WOULD RUN OUT BEFORE YOU GOT MONEY TO BUY MORE.: NEVER TRUE

## 2022-08-31 SDOH — ECONOMIC STABILITY: FOOD INSECURITY: WITHIN THE PAST 12 MONTHS, THE FOOD YOU BOUGHT JUST DIDN'T LAST AND YOU DIDN'T HAVE MONEY TO GET MORE.: NEVER TRUE

## 2022-08-31 ASSESSMENT — PATIENT HEALTH QUESTIONNAIRE - PHQ9
SUM OF ALL RESPONSES TO PHQ9 QUESTIONS 1 & 2: 0
1. LITTLE INTEREST OR PLEASURE IN DOING THINGS: 0
SUM OF ALL RESPONSES TO PHQ QUESTIONS 1-9: 0
2. FEELING DOWN, DEPRESSED OR HOPELESS: 0
SUM OF ALL RESPONSES TO PHQ QUESTIONS 1-9: 0

## 2022-08-31 ASSESSMENT — SOCIAL DETERMINANTS OF HEALTH (SDOH): HOW HARD IS IT FOR YOU TO PAY FOR THE VERY BASICS LIKE FOOD, HOUSING, MEDICAL CARE, AND HEATING?: NOT HARD AT ALL

## 2022-08-31 NOTE — PROGRESS NOTES
Dick Dimas is a 24 y.o. female 17w6d        OB History    Para Term  AB Living   2       1 0   SAB IAB Ectopic Molar Multiple Live Births   1                # Outcome Date GA Lbr Zbigniew/2nd Weight Sex Delivery Anes PTL Lv   2 Current            1 SAB 2021                     Vitals  BP: 114/70  Weight: 148 lb 9.6 oz (67.4 kg)  Heart Rate: 74  Patient Position: Sitting  Albumin: Negative  Glucose: Negative      The patient was seen and evaluated. There was Positive fetal movements. No contractions or leakage of fluid. Signs and symptoms of  labor as well as labor were reviewed. The Nuchal Translucency testing was reviewed and found to be normal. A single marker MSAFP was ordered for a 15-20 week gestational age window. TOP ST OH Reviewed. Dates were reviewed with the patient. An 18-22 week anatomy ultrasound has been ordered. The patient will return to the office for her next visit in 4 weeks. See antepartum flow sheet. 22 PRAF form completed       Assessment:  1. Dick Dimas is a 24 y.o. female  2.   3. 17w6d    Patient Active Problem List    Diagnosis Date Noted    Rh negative state in antepartum period 2022     Priority: High     Needs rhogam at 28 weeks      Abnormal GTT (glucose tolerance test) 2022     Priority: High     3 hour and a1c ordered      Family history of diabetes mellitus 2022     Priority: High     Early 1 hour GTT ordered      History of miscarriage 2022     Priority: High    THC use 2022     Priority: Medium    Chlamydial infection 2021     Priority: Medium    Gonorrhea 2021     Priority: Medium        Diagnosis Orders   1. 17 weeks gestation of pregnancy  Toxoplasma Gondii Antibody, IgG    Toxoplasma Gondii Antibody, IgM      2. Abnormal GTT (glucose tolerance test)  Toxoplasma Gondii Antibody, IgG    Toxoplasma Gondii Antibody, IgM      3.  Family history of diabetes mellitus  Toxoplasma Gondii Antibody, IgG    Toxoplasma Gondii Antibody, IgM      4. History of miscarriage  Toxoplasma Gondii Antibody, IgG    Toxoplasma Gondii Antibody, IgM      5. Rh negative state in antepartum period  Toxoplasma Gondii Antibody, IgG    Toxoplasma Gondii Antibody, IgM            Plan:  RTO in 4 weeks   Lab slips given        Patient was seen with total face to face time of 20 minutes. More than 50% of this visit was on counseling and education regarding her    Diagnosis Orders   1. 17 weeks gestation of pregnancy  Toxoplasma Gondii Antibody, IgG    Toxoplasma Gondii Antibody, IgM      2. Abnormal GTT (glucose tolerance test)  Toxoplasma Gondii Antibody, IgG    Toxoplasma Gondii Antibody, IgM      3. Family history of diabetes mellitus  Toxoplasma Gondii Antibody, IgG    Toxoplasma Gondii Antibody, IgM      4. History of miscarriage  Toxoplasma Gondii Antibody, IgG    Toxoplasma Gondii Antibody, IgM      5. Rh negative state in antepartum period  Toxoplasma Gondii Antibody, IgG    Toxoplasma Gondii Antibody, IgM       and her options. She was also counseled on her preventative health maintenance recommendations and follow-up.

## 2022-09-02 ENCOUNTER — HOSPITAL ENCOUNTER (OUTPATIENT)
Age: 22
Discharge: HOME | End: 2022-09-02
Payer: MEDICARE

## 2022-09-02 LAB
AMOUNT GLUCOSE GIVEN: ABNORMAL G
ESTIMATED AVERAGE GLUCOSE: 97 MG/DL
GLUCOSE FASTING: 88 MG/DL (ref 65–99)
GLUCOSE TOLERANCE TEST 1 HOUR: 127 MG/DL (ref 65–184)
GLUCOSE TOLERANCE TEST 2 HOUR: 94 MG/DL (ref 65–139)
GLUCOSE TOLERANCE TEST 3 HOUR: 53 MG/DL (ref 65–130)
HBA1C MFR BLD: 5 % (ref 4–6)
TOXOPLASM IGM: 0.41 INDEX
TOXOPLASMA BLOOD FOR RATIO: <0.5 IU/ML

## 2022-09-02 PROCEDURE — 86778 TOXOPLASMA ANTIBODY IGM: CPT

## 2022-09-02 PROCEDURE — 36415 COLL VENOUS BLD VENIPUNCTURE: CPT

## 2022-09-02 PROCEDURE — 83036 HEMOGLOBIN GLYCOSYLATED A1C: CPT

## 2022-09-02 PROCEDURE — 86777 TOXOPLASMA ANTIBODY: CPT

## 2022-09-02 PROCEDURE — 82952 GTT-ADDED SAMPLES: CPT

## 2022-09-02 PROCEDURE — 82951 GLUCOSE TOLERANCE TEST (GTT): CPT

## 2022-09-08 LAB
ABDOMINAL CIRCUMFERENCE: NORMAL
BIPARIETAL DIAMETER: NORMAL
ESTIMATED FETAL WEIGHT: NORMAL
FEMORAL DIAMETER: NORMAL
HC/AC: NORMAL
HEAD CIRCUMFERENCE: NORMAL

## 2022-09-15 ENCOUNTER — ROUTINE PRENATAL (OUTPATIENT)
Dept: PERINATAL CARE | Age: 22
End: 2022-09-15
Payer: MEDICARE

## 2022-09-15 VITALS
BODY MASS INDEX: 27.79 KG/M2 | TEMPERATURE: 97.9 F | DIASTOLIC BLOOD PRESSURE: 62 MMHG | HEIGHT: 62 IN | RESPIRATION RATE: 16 BRPM | SYSTOLIC BLOOD PRESSURE: 120 MMHG | HEART RATE: 94 BPM | WEIGHT: 151 LBS

## 2022-09-15 DIAGNOSIS — O99.810 ABNORMAL MATERNAL GLUCOSE TOLERANCE, ANTEPARTUM: Primary | ICD-10-CM

## 2022-09-15 DIAGNOSIS — Z36.86 ENCOUNTER FOR SCREENING FOR RISK OF PRE-TERM LABOR: ICD-10-CM

## 2022-09-15 DIAGNOSIS — Z3A.20 20 WEEKS GESTATION OF PREGNANCY: ICD-10-CM

## 2022-09-15 PROCEDURE — 99999 PR OFFICE/OUTPT VISIT,PROCEDURE ONLY: CPT | Performed by: OBSTETRICS & GYNECOLOGY

## 2022-09-15 PROCEDURE — 76811 OB US DETAILED SNGL FETUS: CPT | Performed by: OBSTETRICS & GYNECOLOGY

## 2022-09-15 PROCEDURE — 76817 TRANSVAGINAL US OBSTETRIC: CPT | Performed by: OBSTETRICS & GYNECOLOGY

## 2022-09-15 NOTE — PROGRESS NOTES
Patient has declined maternal carrier testing ([facility]). Sent for MSAFP (maternal serum alpha-feto protein level) only lab draw today. Advise repeat 3-hour glucose tolerance test between 24-28 weeks gestation to evaluate for gestational diabetes.

## 2022-09-15 NOTE — PROGRESS NOTES
Obstetric/Gynecology Maternal Fetal Medicine Resident Note    Patient has opted for maternal AFP testing and declined maternal genetic carrier screening.         DO RAO Pearce Resident, PGY2  [facility]  9/15/2022, 9:43 AM

## 2022-09-21 ENCOUNTER — HOSPITAL ENCOUNTER (OUTPATIENT)
Age: 22
Setting detail: SPECIMEN
Discharge: HOME | End: 2022-09-21
Payer: MEDICARE

## 2022-09-21 PROCEDURE — 82105 ALPHA-FETOPROTEIN SERUM: CPT

## 2022-09-21 PROCEDURE — 36415 COLL VENOUS BLD VENIPUNCTURE: CPT

## 2022-09-23 ENCOUNTER — ROUTINE PRENATAL (OUTPATIENT)
Dept: OBGYN CLINIC | Age: 22
End: 2022-09-23
Payer: MEDICARE

## 2022-09-23 VITALS
DIASTOLIC BLOOD PRESSURE: 56 MMHG | BODY MASS INDEX: 28.17 KG/M2 | HEART RATE: 80 BPM | WEIGHT: 154 LBS | SYSTOLIC BLOOD PRESSURE: 98 MMHG

## 2022-09-23 DIAGNOSIS — O26.899 RH NEGATIVE STATE IN ANTEPARTUM PERIOD: ICD-10-CM

## 2022-09-23 DIAGNOSIS — Z83.3 FAMILY HISTORY OF DIABETES MELLITUS: ICD-10-CM

## 2022-09-23 DIAGNOSIS — R73.09 ABNORMAL GTT (GLUCOSE TOLERANCE TEST): Primary | ICD-10-CM

## 2022-09-23 DIAGNOSIS — Z67.91 RH NEGATIVE STATE IN ANTEPARTUM PERIOD: ICD-10-CM

## 2022-09-23 DIAGNOSIS — Z87.59 HISTORY OF MISCARRIAGE: ICD-10-CM

## 2022-09-23 DIAGNOSIS — Z3A.21 21 WEEKS GESTATION OF PREGNANCY: ICD-10-CM

## 2022-09-23 PROCEDURE — 1036F TOBACCO NON-USER: CPT | Performed by: NURSE PRACTITIONER

## 2022-09-23 PROCEDURE — G8427 DOCREV CUR MEDS BY ELIG CLIN: HCPCS | Performed by: NURSE PRACTITIONER

## 2022-09-23 PROCEDURE — G8419 CALC BMI OUT NRM PARAM NOF/U: HCPCS | Performed by: NURSE PRACTITIONER

## 2022-09-23 PROCEDURE — 99213 OFFICE O/P EST LOW 20 MIN: CPT | Performed by: NURSE PRACTITIONER

## 2022-09-23 NOTE — PROGRESS NOTES
Lora Wilson is a 24 y.o. female 18w1d        OB History    Para Term  AB Living   2       1 0   SAB IAB Ectopic Molar Multiple Live Births   1                # Outcome Date GA Lbr Zbigniew/2nd Weight Sex Delivery Anes PTL Lv   2 Current            1 SAB 2021               Vitals  BP: (!) 98/56  Weight: 154 lb (69.9 kg)  Heart Rate: 80  Patient Position: Sitting  Albumin: Negative  Glucose: Negative    The patient was seen and evaluated. There was positive fetal movements. No contractions or leakage of fluid. Signs and symptoms of  labor as well as labor were reviewed. The Nuchal Translucency testing was reviewed and found to be normal A single marker MSAFP was reviewed and found to be results pending. The patients anatomy ultrasound has been completed and reviewed with patient. TOP  OH Reviewed. A 28 week lab panel was ordered. This includes a (HH, 1 hr GTT, U/A C&S). The patient is to complete this in the next two to four weeks. The S/S of Pre-Eclampsia were reviewed with the patient in detail. She is to report any of these if they occur. She currently denies any of these. The patient is RH negative Rhogam Ordered no- to be ordered at 28 weeks    The patient was instructed on fetal kick counts and was given a kick sheet to complete every 8 hours. This is to begin at 28 weeks gestation. She was instructed that the baby should move at a minimum of ten times within one hour after a meal. The patient was instructed to lay down on her left side twenty minutes after eating and count movements for up to one hour with a target value of ten movements. She was instructed to notify the office if she did not make that target after two attempts or if after any attempt there was less than four movements. 22 PRAF form completed       Assessment:  1. Lora Wilson is a 24 y.o. female  2.    3. 21w1d    Patient Active Problem List    Diagnosis Date Noted    Rh negative state in antepartum period 08/05/2022     Priority: High     Needs rhogam at 28 weeks      Abnormal GTT (glucose tolerance test) 08/04/2022     Priority: High     3 hour and a1c ordered      Family history of diabetes mellitus 08/03/2022     Priority: High     Early 1 hour GTT ordered      History of miscarriage 06/16/2022     Priority: High    THC use 06/16/2022     Priority: Medium    Chlamydial infection 08/24/2021     Priority: Medium    Gonorrhea 08/24/2021     Priority: Medium        Diagnosis Orders   1. Abnormal GTT (glucose tolerance test)        2. Family history of diabetes mellitus        3. History of miscarriage        4. Rh negative state in antepartum period        5. 21 weeks gestation of pregnancy              Plan:  The patient will return to the office for her next visit in 4 weeks. See antepartum flow sheet. Patient was seen with total face to face time of 20 minutes. More than 50% of this visit was on counseling and education regarding her    Diagnosis Orders   1. Abnormal GTT (glucose tolerance test)        2. Family history of diabetes mellitus        3. History of miscarriage        4. Rh negative state in antepartum period        5. 21 weeks gestation of pregnancy         and her options. She was also counseled on her preventative health maintenance recommendations and follow-up.

## 2022-09-24 LAB
AFP INTERPRETATION: NORMAL
AFP MOM: 0.6
AFP SPECIMEN: NORMAL
AFP: 40 NG/ML
DATE OF BIRTH: NORMAL
DATING METHOD: NORMAL
DETERMINED BY: NORMAL
DIABETIC: NORMAL
DONOR EGG?: NORMAL
DUE DATE: NORMAL
ESTIMATED DUE DATE: NORMAL
FAMILY HISTORY NTD: NORMAL
GESTATIONAL AGE: NORMAL
IN VITRO FERTILIZATION: NORMAL
INSULIN REQ DIABETES: NO
LAST MENSTRUAL PERIOD: NORMAL
MATERNAL AGE AT EDD: 23.2 YR
MATERNAL WEIGHT: 156
MONOCHORIONIC TWINS: NORMAL
NUMBER OF FETUSES: NORMAL
PATIENT WEIGHT UNITS: NORMAL
PATIENT WEIGHT: NORMAL
RACE (MATERNAL): NORMAL
RACE: NORMAL
REPEAT SPECIMEN?: NORMAL
SMOKING: NO
SMOKING: NORMAL
VALPROIC/CARBAMAZEP: NORMAL
ZZ NTE CLEAN UP: HISTORY: NO

## 2022-10-11 ENCOUNTER — ROUTINE PRENATAL (OUTPATIENT)
Dept: OBGYN CLINIC | Age: 22
End: 2022-10-11
Payer: MEDICARE

## 2022-10-11 VITALS
SYSTOLIC BLOOD PRESSURE: 108 MMHG | WEIGHT: 159 LBS | DIASTOLIC BLOOD PRESSURE: 58 MMHG | BODY MASS INDEX: 29.08 KG/M2 | HEART RATE: 97 BPM

## 2022-10-11 DIAGNOSIS — R73.09 ABNORMAL GTT (GLUCOSE TOLERANCE TEST): ICD-10-CM

## 2022-10-11 DIAGNOSIS — Z3A.23 23 WEEKS GESTATION OF PREGNANCY: ICD-10-CM

## 2022-10-11 DIAGNOSIS — Z87.59 HISTORY OF MISCARRIAGE: ICD-10-CM

## 2022-10-11 DIAGNOSIS — O26.899 RH NEGATIVE STATE IN ANTEPARTUM PERIOD: Primary | ICD-10-CM

## 2022-10-11 DIAGNOSIS — Z83.3 FAMILY HISTORY OF DIABETES MELLITUS: ICD-10-CM

## 2022-10-11 DIAGNOSIS — Z67.91 RH NEGATIVE STATE IN ANTEPARTUM PERIOD: Primary | ICD-10-CM

## 2022-10-11 PROCEDURE — G8427 DOCREV CUR MEDS BY ELIG CLIN: HCPCS | Performed by: OBSTETRICS & GYNECOLOGY

## 2022-10-11 PROCEDURE — G8419 CALC BMI OUT NRM PARAM NOF/U: HCPCS | Performed by: OBSTETRICS & GYNECOLOGY

## 2022-10-11 PROCEDURE — 1036F TOBACCO NON-USER: CPT | Performed by: OBSTETRICS & GYNECOLOGY

## 2022-10-11 PROCEDURE — G8484 FLU IMMUNIZE NO ADMIN: HCPCS | Performed by: OBSTETRICS & GYNECOLOGY

## 2022-10-11 PROCEDURE — 99213 OFFICE O/P EST LOW 20 MIN: CPT | Performed by: OBSTETRICS & GYNECOLOGY

## 2022-10-11 NOTE — PROGRESS NOTES
Tevin Pineda is a 24 y.o. female 23w5d        OB History    Para Term  AB Living   2       1 0   SAB IAB Ectopic Molar Multiple Live Births   1                # Outcome Date GA Lbr Zbigniew/2nd Weight Sex Delivery Anes PTL Lv   2 Current            1 SAB 2021               Vitals  BP: (!) 108/58  Weight: 159 lb (72.1 kg)  Heart Rate: 97  Patient Position: Sitting  Albumin: Negative  Glucose: Negative    The patient was seen and evaluated. There was positive fetal movements. No contractions or leakage of fluid. Signs and symptoms of  labor as well as labor were reviewed. The Nuchal Translucency testing was reviewed and found to be normal A single marker MSAFP was reviewed and found to be normal. The patients anatomy ultrasound has been completed and reviewed with patient. TOP  OH Reviewed. A 28 week lab panel was ordered. This includes a (HH, 1 hr GTT, U/A C&S). The patient is to complete this in the next two to four weeks. The S/S of Pre-Eclampsia were reviewed with the patient in detail. She is to report any of these if they occur. She currently denies any of these. The patient is RH negative Rhogam Ordered yes    The patient was instructed on fetal kick counts and was given a kick sheet to complete every 8 hours. This is to begin at 28 weeks gestation. She was instructed that the baby should move at a minimum of ten times within one hour after a meal. The patient was instructed to lay down on her left side twenty minutes after eating and count movements for up to one hour with a target value of ten movements. She was instructed to notify the office if she did not make that target after two attempts or if after any attempt there was less than four movements. 10/11/22 Tdap @ 27 week gest   10/11/22 Pt declined Flu Vacc  22 PRAF form completed       Assessment:  1. Tevin Pineda is a 24 y.o. female  2.    3. 23w5d    Patient Active Problem List Diagnosis Date Noted    Rh negative state in antepartum period 2022     Priority: High     Needs rhogam at 28 weeks      Abnormal GTT (glucose tolerance test) 2022     Priority: High     3 hour and a1c ordered      Family history of diabetes mellitus 2022     Priority: High     Early 1 hour GTT ordered      History of miscarriage 2022     Priority: High    THC use 2022     Priority: Medium    Chlamydial infection 2021     Priority: Medium    Gonorrhea 2021     Priority: Medium        Diagnosis Orders   1. Rh negative state in antepartum period        2. Abnormal GTT (glucose tolerance test)        3. Family history of diabetes mellitus        4. History of miscarriage        5. 23 weeks gestation of pregnancy              Plan:  The patient will return to the office for her next visit in 4 weeks. See antepartum flow sheet. [id number] [facility] Gynecology  [facility] [address]  [phone]    [facility]  10/11/2022  No LMP recorded (lmp unknown). Patient is pregnant. INITIAL OBSTETRICAL VISIT EVALUATION:  The patient was seen full history and physical was completed/reviewed. Cytology was collected for patients over 24years of age. Cultures were collected. The patient was counseled on office policies and she was counseled on termination of pregnancy in the state of [place]. The patient was counseled on Toxoplasmosis, HIV, Tobacco Abuse, Group Beta Strep Infections, Cystic Fibrosis,  Labor precautions and Sickle Cell disease. The patient was counseled on the risks of tobacco abuse. Both maternal and fetal. She was instructed to stop smoking if currently using tobacco. Morbidity, mortality, and cessation programs were reviewed.  The risks include but are not limited to increased risks of  labor,  delivery, premature rupture of membranes, intrauterine growth restriction, intrauterine fetal demise and abruptio placenta. Secondary smoke risks were also reviewed. Increases in cancer, respiratory problems, and sudden infant death syndrome were reviewed as well. The patient was informed of a 2-4% risk of congenital anomalies in the general population. She was also informed that karyotyping is the only way to evaluate the fetus for genetic problems and genetic lethal anomalies. Chorionic villous sampling, amniocentesis and Maternal Genetic Blood Sampling-(NIPT Testing) were also discussed with morbidity rates in detail. She declined any of the options. Route of delivery and counseling on vaginal, operative vaginal, and  sections were completed with the risks of each to both the patient as well as her baby. The possibility of a blood transfusion was discussed as well. The patient was not opposed to receiving a transfusion if needed. Nuchal translucency and MSAFP single marker testing was reviewed in detail with attention to timing of testing and their windows. For patients beyond the gestational age for Nuchal translucency evaluation, ([id number]) Quad testing was recommended. Timing for the Quad test was reviewed. Benefits of the above testing was reviewed. A second trimester amniocentesis was also made available to the patient. Risks, Benefits and non-invasive alternative testing was reviewed. The patients diagnosed with Advanced maternal age, AMA (age of 29 yo or beyond at delivery), had NIPT recommended. This was discussed in lay terms with Risks and Benefits reviewed. The literature regarding a questionable link to pitocin augmentation and induction of labor, the assistance of labor contractions and the initiation of contractions to help delivery, have been reviewed with the patient regarding the increased potential of having a  with Attention Deficit Hyperactivity Disorder and or Autism.  These two disorders and the ramifications of their impact on a child and the family caring for that child has been reviewed with the patient in detail. She was given the risks, benefits and alternatives of the use of this medication. She has agreed to its use in the delivery of her unborn child if needed at the time of delivery, Yes. The patient was questioned in detail regarding any genetic misnomer history, chromosomal abnormalities, or learning disabilities in  herself, the father of the baby or their families. SHE DENIED ANY HISTORY AS STATED ABOVE: Yes    Upon completion of the visit all questions were answered and the patients follow-up and testing schedule were reviewed. Prenatal vitamins were given. Patient was seen with total face to face time of 20 minutes. More than 50% of this visit was on counseling and education regarding her    Diagnosis Orders   1. Rh negative state in antepartum period        2. Abnormal GTT (glucose tolerance test)        3. Family history of diabetes mellitus        4. History of miscarriage        5. 23 weeks gestation of pregnancy         and her options. She was also counseled on her preventative health maintenance recommendations and follow-up.

## 2022-10-23 ENCOUNTER — HOSPITAL ENCOUNTER (OUTPATIENT)
Age: 22
Discharge: HOME | End: 2022-10-23
Attending: OBSTETRICS & GYNECOLOGY | Admitting: OBSTETRICS & GYNECOLOGY
Payer: MEDICARE

## 2022-10-23 ENCOUNTER — HOSPITAL ENCOUNTER (EMERGENCY)
Age: 22
Discharge: HOME | End: 2022-10-23
Attending: EMERGENCY MEDICINE
Payer: MEDICARE

## 2022-10-23 VITALS
RESPIRATION RATE: 16 BRPM | DIASTOLIC BLOOD PRESSURE: 61 MMHG | OXYGEN SATURATION: 97 % | HEART RATE: 93 BPM | SYSTOLIC BLOOD PRESSURE: 104 MMHG | TEMPERATURE: 98.1 F

## 2022-10-23 VITALS
RESPIRATION RATE: 18 BRPM | HEART RATE: 105 BPM | OXYGEN SATURATION: 98 % | DIASTOLIC BLOOD PRESSURE: 77 MMHG | BODY MASS INDEX: 29.08 KG/M2 | TEMPERATURE: 98.7 F | SYSTOLIC BLOOD PRESSURE: 98 MMHG | WEIGHT: 158 LBS | HEIGHT: 62 IN

## 2022-10-23 DIAGNOSIS — J02.9 VIRAL PHARYNGITIS: Primary | ICD-10-CM

## 2022-10-23 DIAGNOSIS — J06.9 UPPER RESPIRATORY TRACT INFECTION, UNSPECIFIED TYPE: ICD-10-CM

## 2022-10-23 PROBLEM — R10.9 CRAMPING AFFECTING PREGNANCY, ANTEPARTUM: Status: ACTIVE | Noted: 2022-10-23

## 2022-10-23 PROBLEM — O26.899 CRAMPING AFFECTING PREGNANCY, ANTEPARTUM: Status: ACTIVE | Noted: 2022-10-23

## 2022-10-23 LAB
INFLUENZA A: NOT DETECTED
INFLUENZA B: NOT DETECTED
SARS-COV-2 RNA, RT PCR: NOT DETECTED
SOURCE: NORMAL
SPECIMEN DESCRIPTION: NORMAL

## 2022-10-23 PROCEDURE — 99213 OFFICE O/P EST LOW 20 MIN: CPT

## 2022-10-23 PROCEDURE — 87636 SARSCOV2 & INF A&B AMP PRB: CPT

## 2022-10-23 PROCEDURE — 99283 EMERGENCY DEPT VISIT LOW MDM: CPT

## 2022-10-23 ASSESSMENT — ENCOUNTER SYMPTOMS
NAUSEA: 0
COUGH: 1
VOMITING: 0

## 2022-10-23 ASSESSMENT — LIFESTYLE VARIABLES
HOW OFTEN DO YOU HAVE A DRINK CONTAINING ALCOHOL: NEVER
HOW MANY STANDARD DRINKS CONTAINING ALCOHOL DO YOU HAVE ON A TYPICAL DAY: PATIENT DOES NOT DRINK

## 2022-10-23 ASSESSMENT — PAIN - FUNCTIONAL ASSESSMENT: PAIN_FUNCTIONAL_ASSESSMENT: 0-10

## 2022-10-23 ASSESSMENT — PAIN DESCRIPTION - LOCATION: LOCATION: THROAT

## 2022-10-23 ASSESSMENT — PAIN SCALES - GENERAL: PAINLEVEL_OUTOF10: 8

## 2022-10-23 NOTE — ED PROVIDER NOTES
[address] [facility]  Emergency Medicine Department    Pt Name: Qamar Salgado  MRN: [id number]  [place] 2000  Date of evaluation: 10/23/2022  Provider: Tai Castano MD    CHIEF COMPLAINT     Chief Complaint   Patient presents with    Pharyngitis    Congestion       HISTORY OF PRESENT ILLNESS  (Location/Symptom, Timing/Onset, Context/Setting,Quality, Duration, Modifying Factors, Severity.)   Qamar Salgado is a 24 y.o. female who presents to the emergency department complaining of a sore throat and nasal congestion for the past 3 to 4 days. She has been using Tylenol Cold and flu as well as Vicks vapor rub. She denies any fevers. She is currently 25 weeks pregnant. She has minimal cough but reports her throat hurts when she does cough. Nursing Notes were reviewed. ALLERGIES     Patient has no known allergies. CURRENT MEDICATIONS       Previous Medications    DOXYLAMINE-PYRIDOXINE 10-10 MG TBEC    Take one tablet daily as needed for nausea    PRENATAL VIT-FE FUMARATE-FA (PNV PRENATAL PLUS MULTIVITAMIN) 27-1 MG TABS    Take 1 tablet by mouth daily       PAST MEDICAL HISTORY         Diagnosis Date    Anxiety     Depression     Depression        SURGICAL HISTORY           Procedure Laterality Date    TONSILLECTOMY      TYMPANOSTOMY TUBE PLACEMENT         FAMILY HISTORY           Problem Relation Age of Onset    Hypertension Maternal Grandmother     Diabetes Maternal Grandmother     Hypertension Maternal Grandfather     Heart Disease Maternal Grandfather     Diabetes Maternal Grandfather     Diabetes Mother      Family Status   Relation Name Status    PGF unknown Alive    PGM unknown Alive    MGM  Alive    MGF      Father  Alive    Mother  Alive        SOCIAL HISTORY      reports that she has never smoked. She has never used smokeless tobacco. She reports that she does not currently use alcohol.  She reports that she does not currently use drugs after having used the following drugs: Marijuana (Nova ). REVIEW OF SYSTEMS    (2-9 systems for level 4, 10 or more for level 5)     Review of Systems   Constitutional:  Negative for chills and fever. HENT:  Positive for congestion. Respiratory:  Positive for cough. Gastrointestinal:  Negative for nausea and vomiting. Allergic/Immunologic: Negative for immunocompromised state. All other systems reviewed and are negative. PHYSICAL EXAM    (up to 7 for level 4, 8 or more for level 5)     ED Triage Vitals [10/23/22 1356]   BP Temp Temp Source Heart Rate Resp SpO2 Height Weight   98/77 98.7 °F (37.1 °C) Oral (!) 105 18 98 % 5' 2\" (1.575 m) 158 lb (71.7 kg)       Physical Exam  Vitals and nursing note reviewed. Constitutional:       General: She is not in acute distress. Appearance: She is well-developed. HENT:      Head: Normocephalic and atraumatic. Nose: Congestion present. Mouth/Throat:      Mouth: Mucous membranes are moist. No oral lesions. Pharynx: No pharyngeal swelling, oropharyngeal exudate or posterior oropharyngeal erythema. Tonsils: No tonsillar exudate or tonsillar abscesses. Eyes:      Extraocular Movements: Extraocular movements intact. Cardiovascular:      Rate and Rhythm: Normal rate and regular rhythm. Heart sounds: Normal heart sounds. Pulmonary:      Effort: Pulmonary effort is normal. No respiratory distress. Breath sounds: Normal breath sounds. Abdominal:      Palpations: Abdomen is soft. Tenderness: There is no abdominal tenderness. There is no guarding. Musculoskeletal:         General: No tenderness or deformity. Normal range of motion. Cervical back: Normal range of motion and neck supple. Lymphadenopathy:      Cervical: No cervical adenopathy. Skin:     General: Skin is warm and dry. Findings: No rash. Neurological:      Mental Status: She is alert and oriented to person, place, and time.    Psychiatric:         Behavior: Behavior normal. Thought Content: Thought content normal.         Judgment: Judgment normal.       DIAGNOSTIC RESULTS     RADIOLOGY:   Non-plain film images such as CT, Ultrasound and MRI are read by theradiologist. Plain radiographic images are visualized and preliminarily interpreted by the emergency physician with the below findings:    None indicated    ED BEDSIDE ULTRASOUND:   Performed by ED Physician - none    LABS:  Labs Reviewed   COVID-19 & INFLUENZA COMBO       All other labs were within normal range or not returned as of this dictation. EMERGENCYDEPARTMENT COURSE and DIFFERENTIAL DIAGNOSIS/MDM:   Vitals:    Vitals:    10/23/22 1356   BP: 98/77   Pulse: (!) 105   Resp: 18   Temp: 98.7 °F (37.1 °C)   TempSrc: Oral   SpO2: 98%   Weight: 158 lb (71.7 kg)   Height: 5' 2\" (1.575 m)     25-year-old female presenting with nasal congestion and sore throat for the past 3 to 4 days consistent with a viral URI. There is no significant oropharyngeal erythema, tonsillar hypertrophy, or exudates to suggest strep throat. Will swab for COVID and flu and discharge with instructions to follow-up with her OB/GYN. Will provide recommendations for symptomatic management. CONSULTS:  None    PROCEDURES:  None indicated    FINAL IMPRESSION     1. Viral pharyngitis    2.  Upper respiratory tract infection, unspecified type          DISPOSITION/PLAN   DISPOSITION Decision To Discharge 10/23/2022 03:37:01 PM    PATIENT REFERRED TO:   Your OB/GYN    Schedule an appointment as soon as possible for a visit   For Follow up  DISCHARGE MEDICATIONS:     New Prescriptions    No medications on file     (Please note that portions of this note were completed with a voice recognition program.  Efforts were made to edit the dictations butoccasionally words are mis-transcribed.)    Eris Desouza MD  Attending Emergency Physician          Eris Desouza MD  10/23/22 [id number]

## 2022-10-23 NOTE — FLOWSHEET NOTE
STEPHANIE Riojas) notified that patient was coming back down for further evaluation of cold like symptoms.

## 2022-10-23 NOTE — ED NOTES
Mode of arrival (squad #, walk in, police, etc) : walk in        Chief complaint(s): nasal congestion, cough, sore throat        Arrival Note (brief scenario, treatment PTA, etc). : Pt arrives to the ED with the complaint of nasal drainage/congestion, sore throat and cough. Pt did have some abdominal cramping and was evaluated in OB before being seen in the ER as she is 25 weeks pregnant. Pt denies chest pain and SOB. Vitals stable. Pt is PWD. C= \"Have you ever felt that you should Cut down on your drinking? \"  No  A= \"Have people Annoyed you by criticizing your drinking? \"  No  G= \"Have you ever felt bad or Guilty about your drinking? \"  No  E= \"Have you ever had a drink as an Eye-opener first thing in the morning to steady your nerves or to help a hangover? \"  No      Deferred []      Reason for deferring: N/A    *If yes to two or more: probable alcohol abuse. Patricia Mosqueda RN  10/23/22 [id number]

## 2022-10-23 NOTE — DISCHARGE INSTRUCTIONS
Call your physicians office or notify your physician by paging them if you experience:    Signs of Pre-eclampsia:  Dizziness or severe headache  Spots before eyes or blurred vision  Epigastric pain / Right side abdominal pain  Swelling of face, hands, legs or feet not decreased with rest on left side.     Signs of infection:  Chills  Fever  Start of, or change in, vaginal discharge    Signs of labor:  Uterine contractions are regular and 5 minutes apart if 1st baby or 10 minutes apart if not 1st baby  Bag or nuñez leaking or gush of fluid from vagina  Any vaginal bleeding that is heavier than a menstrual period  Intermittent low backache  Abdominal or menstrual like cramping that is constant or heavier, comes and goes  Vaginal pressure    Potential for dehydration:  Vomiting or diarrhea for several hours    Potential abnormal fetal well being signs:  Decrease or absence of baby movement

## 2022-10-23 NOTE — DISCHARGE INSTRUCTIONS
We would recommend using Afrin nasal spray to help decongest.  This can only be used for 3 days and should not be used more than twice a day. You may also use nasal saline irrigation such as a Tenisha pot or NeilMed squeeze bottle.

## 2022-10-23 NOTE — FLOWSHEET NOTE
Patient arrives on unit from ED with cold like symptoms. (Runny nose and sore throat that started about Wednesday 10/19/2022. States that she started cramping this morning. Denies any gush of fluid or bleeding. Denies any intercourse with in the last 24 hours.

## 2022-10-23 NOTE — PROGRESS NOTES
RN Obstetrics Triage Note      Date: 10/23/2022  Time: 1:05 PM    Patient Name: Brigida Rivera  Patient : 2000  MRN #: [id number]  [facility] #: [id number]    Patient OBGYN Provider: Dr. Veronica Gregorio is a 24 y.o. female     Chief Complaint: cold symptoms (runny nose, sore throat)    HPI: Brigida Rivera is a 24 y.o.  at 25w3d who presents with cold like symptoms & sore throat. States that she has cramping that started this morning and isn't sure if it is just the baby moving. DATING:  LMP: No LMP recorded (lmp unknown). Patient is pregnant. Estimated Date of Delivery: 23     PREGNANCY RISK FACTORS:  Patient Active Problem List   Diagnosis    Chlamydial infection    Gonorrhea    THC use    History of miscarriage    Family history of diabetes mellitus    Abnormal GTT (glucose tolerance test)    Rh negative state in antepartum period         Allergies: Allergies as of 10/23/2022    (No Known Allergies)       Medications:  No current facility-administered medications on file prior to encounter.      Current Outpatient Medications on File Prior to Encounter   Medication Sig Dispense Refill    doxylamine-pyridoxine 10-10 MG TBEC Take one tablet daily as needed for nausea 60 tablet 0    Prenatal Vit-Fe Fumarate-FA (PNV PRENATAL PLUS MULTIVITAMIN) 27-1 MG TABS Take 1 tablet by mouth daily 30 tablet 11         Past Medical History:   Diagnosis Date    Anxiety     Depression     Depression        Past Surgical History:   Procedure Laterality Date    TONSILLECTOMY      TYMPANOSTOMY TUBE PLACEMENT         OB History    Para Term  AB Living   2 0 0 0 1 0   SAB IAB Ectopic Molar Multiple Live Births   1 0 0 0 0 0      # Outcome Date GA Lbr Zbigniew/2nd Weight Sex Delivery Anes PTL Lv   2 Current            1 SAB 2021               Family History   Problem Relation Age of Onset    Hypertension Maternal Grandmother     Diabetes Maternal Grandmother     Hypertension Maternal Grandfather     Heart Disease Maternal Grandfather     Diabetes Maternal Grandfather     Diabetes Mother          Social History     Socioeconomic History    Marital status: Single     Spouse name: Not on file    Number of children: Not on file    Years of education: Not on file    Highest education level: Not on file   Occupational History    Not on file   Tobacco Use    Smoking status: Never    Smokeless tobacco: Never   Vaping Use    Vaping Use: Former    Devices: Disposable   Substance and Sexual Activity    Alcohol use: Not Currently    Drug use: Not Currently     Types: Marijuana Deadra Addy)     Comment: \"stopped 1-2mos/ago\" 9/15/2022    Sexual activity: Yes     Partners: Male   Other Topics Concern    Not on file   Social History Narrative    Not on file     Social Determinants of Health     Financial Resource Strain: Low Risk     Difficulty of Paying Living Expenses: Not hard at all   Food Insecurity: No Food Insecurity    Worried About Running Out of Food in the Last Year: Never true    Ran Out of Food in the Last Year: Never true   Transportation Needs: Not on file   Physical Activity: Not on file   Stress: Not on file   Social Connections: Not on file   Intimate Partner Violence: Not on file   Housing Stability: Not on file       REVIEW OF SYSTEMS:   Review of Systems      Physical Exam:  Vitals:    10/23/22 1254   BP: 104/61   Pulse: 93   Resp: 16   Temp: 98.1 °F (36.7 °C)   TempSrc: Oral   SpO2: 97%       General appearance:  Alert, no apparent distress, and cooperative  Skin:  Warm, dry, no rashes or erythema  Neurologic:  alert, oriented, normal speech and gait, normal reflexes  Lungs:  No increased work of breathing, good air exchange, clear to auscultation bilaterally, no crackles or wheezing  Heart:  regular rate and rhythm and no murmur   Breast:  Deferred   Abdomen:  soft, non-tender, no right upper quadrant tenderness, no CVA tenderness.   Gravid and consistent with her gestational age, Uterus non-tender, no signs of abruption and no signs of chorioamnionitis  Extremities:  no calf tenderness, non edematous, DTRs: normal    PELVIC EXAM:   Cervix Check:  dilated,  % effaced,  station,  position,  consistency,     MEMBRANES:    Nitrazine:    Amnisure:                       FETAL HEART RATE:       Baseline: 140     Variability: moderate     Accelerations: present     Decelerations: absent            CONTRACTIONS: Frequency: none                           Duration:                            Intensity:       PRENATAL LAB RESULTS:   Blood Type/Rh: A neg  Group B Strep:    Last Ultrasound: 09/15/2022  Placenta Location:   Placenta Abnormality (I.e. previa, velamentous insertion, etc.):   Fetus Position (vertex/breech):    Placenta Previa:   COVID Testing: no      ASSESSMENT/PLAN:  eLon Amaro is a 24 y.o. female   At 20w1d     Reviewed HPI with Dr. Tierra Miller on-call provider. Standing Orders: continuous EFM/TOCO (gestational age 23 weeks and greater) & admit as observation    Received verbal orders:   IV: no   Medications: no   Labs: no   Diet: REGULAR    Plan is to HAVE PATIENT FOLLOW UP WITH PRIMARY CARE PROVIDER AND DISCHARGE PATIENT BACK TO ED FOR TREATMENT OF COLD SYMPTOMS.      FHR: Category 1      Bonnie Kocher, RN  10/23/2022, 1:05 PM

## 2022-11-09 ENCOUNTER — ROUTINE PRENATAL (OUTPATIENT)
Dept: OBGYN CLINIC | Age: 22
End: 2022-11-09
Payer: MEDICARE

## 2022-11-09 VITALS
SYSTOLIC BLOOD PRESSURE: 114 MMHG | WEIGHT: 164 LBS | DIASTOLIC BLOOD PRESSURE: 70 MMHG | BODY MASS INDEX: 30 KG/M2 | HEART RATE: 82 BPM

## 2022-11-09 DIAGNOSIS — Z67.91 RH NEGATIVE STATE IN ANTEPARTUM PERIOD: ICD-10-CM

## 2022-11-09 DIAGNOSIS — Z3A.28 28 WEEKS GESTATION OF PREGNANCY: ICD-10-CM

## 2022-11-09 DIAGNOSIS — Z83.3 FAMILY HISTORY OF DIABETES MELLITUS: ICD-10-CM

## 2022-11-09 DIAGNOSIS — Z87.59 HISTORY OF MISCARRIAGE: ICD-10-CM

## 2022-11-09 DIAGNOSIS — R73.09 ABNORMAL GTT (GLUCOSE TOLERANCE TEST): Primary | ICD-10-CM

## 2022-11-09 DIAGNOSIS — O26.899 RH NEGATIVE STATE IN ANTEPARTUM PERIOD: ICD-10-CM

## 2022-11-09 PROCEDURE — 99213 OFFICE O/P EST LOW 20 MIN: CPT | Performed by: NURSE PRACTITIONER

## 2022-11-09 PROCEDURE — G8419 CALC BMI OUT NRM PARAM NOF/U: HCPCS | Performed by: NURSE PRACTITIONER

## 2022-11-09 PROCEDURE — 1036F TOBACCO NON-USER: CPT | Performed by: NURSE PRACTITIONER

## 2022-11-09 PROCEDURE — G8484 FLU IMMUNIZE NO ADMIN: HCPCS | Performed by: NURSE PRACTITIONER

## 2022-11-09 PROCEDURE — G8427 DOCREV CUR MEDS BY ELIG CLIN: HCPCS | Performed by: NURSE PRACTITIONER

## 2022-11-09 NOTE — PROGRESS NOTES
Richard Liu is a 24 y.o. female 31w5d        OB History    Para Term  AB Living   2       1 0   SAB IAB Ectopic Molar Multiple Live Births   1                # Outcome Date GA Lbr Zbigniew/2nd Weight Sex Delivery Anes PTL Lv   2 Current            1 SAB 2021               Vitals  BP: 114/70  Weight: 164 lb (74.4 kg)  Heart Rate: 82  Patient Position: Sitting  Albumin: Negative  Glucose: Negative      The patient was seen and evaluated. There was positive fetal movements. No contractions or leakage of fluid. Signs and symptoms of  labor as well as labor were reviewed. The S/S of Pre-Eclampsia were reviewed with the patient in detail. She is to report any of these if they occur. She currently denies any of these. The patient had her 28 week labs ordered. Admission on 10/23/2022, Discharged on 10/23/2022   Component Date Value Ref Range Status    Specimen Description 10/23/2022 . NASOPHARYNGEAL SWAB   Final    Source 10/23/2022 . NASOPHARYNGEAL SWAB   Final    SARS-CoV-2 RNA, RT PCR 10/23/2022 Not Detected  Not Detected Final    Comment:       Testing was performed using WILY Rocio SARS-CoV-2 and Influenza A/B nucleic acid assay. This test is a multiplex Real-Time Reverse Transcriptase Polymerase Chain Reaction   (RT-PCR)-based in vitro diagnostic test intended for the qualitative detection of nucleic   acids from SARS-CoV-2,  influenza A, and influenza B in nasopharyngeal and nasal swab specimens for use under the   FDA's Emergency Use Authorization (EUA) only. Not Detected results do not preclude SARS-CoV-2 infection and should not be used as the sole   basis for patient management decisions. Negative results must be combined with clinical observations, patient history, and   epidemiological information.         Fact sheet for Patients: FindDrives.pl  Fact sheet for Healthcare Providers: FindDrives.pl Results reported to the appropriate Health Department      INFLUENZA A 10/23/2022 Not Detected  Not Detected Final    INFLUENZA B 10/23/2022 Not Detected  Not Detected Final   ]     10/11/22 Tdap @ 27 week gest   10/11/22 Pt declined Flu Vacc  22 PRAF form completed     T-Dap Vaccine (27-36 weeks): awaiting    The patient was instructed on fetal kick counts and was given a kick sheet to complete every 8 hours. She was instructed that the baby should move at a minimum of ten times within one hour after a meal. The patient was instructed to lay down on her left side twenty minutes after eating and count movements for up to one hour with a target value of ten movements. She was instructed to notify the office if she did not make that target after two attempts or if after any attempt there was less than four movements. The patient reports that the targets have been made Yes.  Testing:  Not indicated    Assessment:  1Jordyn Dunne is a 24 y.o. female  2.   3. 27w6d    Patient Active Problem List    Diagnosis Date Noted    Rh negative state in antepartum period 2022     Priority: High     Needs rhogam at 28 weeks      Abnormal GTT (glucose tolerance test) 2022     Priority: High     3 hour and a1c ordered      Family history of diabetes mellitus 2022     Priority: High     Early 1 hour GTT ordered      History of miscarriage 2022     Priority: High    Cramping affecting pregnancy, antepartum 10/23/2022     Priority: Medium    THC use 2022     Priority: Medium    Chlamydial infection 2021     Priority: Medium    Gonorrhea 2021     Priority: Medium        Diagnosis Orders   1. Abnormal GTT (glucose tolerance test)        2. Family history of diabetes mellitus        3. History of miscarriage        4.  Rh negative state in antepartum period  Antibody Screen    Rho(D) immune globulin (RhoGAM) injection only      5. 28 weeks gestation of pregnancy CBC with Auto Differential    Urinalysis with Reflex to Culture    Antibody Screen    Rho(D) immune globulin (RhoGAM) injection only                Plan:  The patient will return to the office for her next visit in 2 weeks. See antepartum flow sheet. Lab slip given  Reviewed s/sx of  labor and preeclampsia  Continue FKCs- starting tomorrow        Testing Indicated: No  Scheduled with Nursing-Pt notified: No      Patient was seen with total face to face time of 20 minutes. More than 50% of this visit was on counseling and education regarding her    Diagnosis Orders   1. Abnormal GTT (glucose tolerance test)        2. Family history of diabetes mellitus        3. History of miscarriage        4. Rh negative state in antepartum period  Antibody Screen    Rho(D) immune globulin (RhoGAM) injection only      5. 28 weeks gestation of pregnancy  CBC with Auto Differential    Urinalysis with Reflex to Culture    Antibody Screen    Rho(D) immune globulin (RhoGAM) injection only       and her options. She was also counseled on her preventative health maintenance recommendations and follow-up.

## 2022-11-10 ENCOUNTER — HOSPITAL ENCOUNTER (OUTPATIENT)
Age: 22
Discharge: HOME | End: 2022-11-10
Payer: MEDICARE

## 2022-11-10 ENCOUNTER — TELEPHONE (OUTPATIENT)
Dept: OBGYN CLINIC | Age: 22
End: 2022-11-10

## 2022-11-10 DIAGNOSIS — Z67.91 RH NEGATIVE STATE IN ANTEPARTUM PERIOD: ICD-10-CM

## 2022-11-10 DIAGNOSIS — Z3A.28 28 WEEKS GESTATION OF PREGNANCY: ICD-10-CM

## 2022-11-10 DIAGNOSIS — O26.899 RH NEGATIVE STATE IN ANTEPARTUM PERIOD: ICD-10-CM

## 2022-11-10 LAB
ABSOLUTE EOS #: 0.1 K/UL (ref 0–0.4)
ABSOLUTE LYMPH #: 1.6 K/UL (ref 1–4.8)
ABSOLUTE MONO #: 0.6 K/UL (ref 0.1–1.3)
AMORPHOUS: ABNORMAL
BASOPHILS # BLD: 1 % (ref 0–2)
BASOPHILS ABSOLUTE: 0.1 K/UL (ref 0–0.2)
BILIRUBIN URINE: NEGATIVE
CASTS UA: ABNORMAL /LPF
COLOR: YELLOW
EOSINOPHILS RELATIVE PERCENT: 1 % (ref 0–4)
EPITHELIAL CELLS UA: ABNORMAL /HPF
GLUCOSE URINE: NEGATIVE
HCT VFR BLD CALC: 38.8 % (ref 36–46)
HEMOGLOBIN: 13.1 G/DL (ref 12–16)
KETONES, URINE: ABNORMAL
LEUKOCYTE ESTERASE, URINE: ABNORMAL
LYMPHOCYTES # BLD: 18 % (ref 24–44)
MCH RBC QN AUTO: 30 PG (ref 26–34)
MCHC RBC AUTO-ENTMCNC: 33.7 G/DL (ref 31–37)
MCV RBC AUTO: 89.2 FL (ref 80–100)
MONOCYTES # BLD: 7 % (ref 1–7)
MUCUS: ABNORMAL
NITRITE, URINE: NEGATIVE
PDW BLD-RTO: 13.9 % (ref 11.5–14.9)
PH UA: 7 (ref 5–8)
PLATELET # BLD: 252 K/UL (ref 150–450)
PMV BLD AUTO: 8.9 FL (ref 6–12)
PROTEIN UA: ABNORMAL
RBC # BLD: 4.35 M/UL (ref 4–5.2)
RBC UA: ABNORMAL /HPF
SEG NEUTROPHILS: 73 % (ref 36–66)
SEGMENTED NEUTROPHILS ABSOLUTE COUNT: 6.4 K/UL (ref 1.3–9.1)
SPECIFIC GRAVITY UA: 1.03 (ref 1–1.03)
TURBIDITY: ABNORMAL
URINE HGB: NEGATIVE
UROBILINOGEN, URINE: NORMAL
WBC # BLD: 8.8 K/UL (ref 3.5–11)
WBC UA: ABNORMAL /HPF
YEAST: ABNORMAL

## 2022-11-10 PROCEDURE — 81001 URINALYSIS AUTO W/SCOPE: CPT

## 2022-11-10 PROCEDURE — 6360000002 HC RX W HCPCS: Performed by: OBSTETRICS & GYNECOLOGY

## 2022-11-10 PROCEDURE — 85025 COMPLETE CBC W/AUTO DIFF WBC: CPT

## 2022-11-10 PROCEDURE — 36415 COLL VENOUS BLD VENIPUNCTURE: CPT

## 2022-11-10 PROCEDURE — 87086 URINE CULTURE/COLONY COUNT: CPT

## 2022-11-10 PROCEDURE — 86900 BLOOD TYPING SEROLOGIC ABO: CPT

## 2022-11-10 PROCEDURE — 86850 RBC ANTIBODY SCREEN: CPT

## 2022-11-10 PROCEDURE — 96372 THER/PROPH/DIAG INJ SC/IM: CPT

## 2022-11-10 PROCEDURE — 86901 BLOOD TYPING SEROLOGIC RH(D): CPT

## 2022-11-10 RX ORDER — FLUCONAZOLE 150 MG/1
150 TABLET ORAL ONCE
Qty: 2 TABLET | Refills: 0 | Status: SHIPPED | OUTPATIENT
Start: 2022-11-10 | End: 2022-11-10

## 2022-11-10 RX ADMIN — HUMAN RHO(D) IMMUNE GLOBULIN 300 MCG: 1500 SOLUTION INTRAMUSCULAR; INTRAVENOUS at 11:03

## 2022-11-10 NOTE — TELEPHONE ENCOUNTER
Patient notified of results and recommendations, [facility] [address].

## 2022-11-10 NOTE — FLOWSHEET NOTE
Presents to unit for Rhogam injection. Voices no complaints. See MAR. Information sheet: \"Rhophylac delivers: Protection with you in mind' given. Patient verbalizes understanding. Acknowledgement/Agreement Rho (PENNIE) Immune Globulin Agreement signed. Breastfeeding education information given to patient and she verbalizes understanding about the following:  Skin to Skin Contact for You and Your Baby. Benefits of breastfeeding. Risks of formula given and discussed with mother. What do the experts say about the use of pacifiers/supplementation of a  infant? Questions answered. Mother relates she wants to breastfeed.

## 2022-11-10 NOTE — TELEPHONE ENCOUNTER
----- Message from MONO Catalan NP sent at 11/10/2022 12:44 PM EST -----  + yeast in urine   Diflucan 150 mg PO x 1 repeat in 7 days

## 2022-11-11 LAB
ABO/RH: NORMAL
ANTIBODY SCREEN: NEGATIVE
BLD PROD TYP BPU: NORMAL
BLD PROD TYP BPU: NORMAL
CULTURE: NORMAL
HISTORY CHECK: NORMAL
Lab: 1
SPECIMEN DESCRIPTION: NORMAL
STATUS OF UNITS: NORMAL
TRANSFUSION STATUS: NORMAL
UNIT DIVISION: 0
UNIT NUMBER: NORMAL

## 2022-12-02 ENCOUNTER — ROUTINE PRENATAL (OUTPATIENT)
Dept: OBGYN CLINIC | Age: 22
End: 2022-12-02
Payer: MEDICARE

## 2022-12-02 VITALS
WEIGHT: 170 LBS | DIASTOLIC BLOOD PRESSURE: 70 MMHG | SYSTOLIC BLOOD PRESSURE: 114 MMHG | BODY MASS INDEX: 31.09 KG/M2 | HEART RATE: 92 BPM

## 2022-12-02 DIAGNOSIS — Z83.3 FAMILY HISTORY OF DIABETES MELLITUS: ICD-10-CM

## 2022-12-02 DIAGNOSIS — R73.09 ABNORMAL GTT (GLUCOSE TOLERANCE TEST): ICD-10-CM

## 2022-12-02 DIAGNOSIS — Z87.59 HISTORY OF MISCARRIAGE: ICD-10-CM

## 2022-12-02 DIAGNOSIS — O26.899 RH NEGATIVE STATE IN ANTEPARTUM PERIOD: ICD-10-CM

## 2022-12-02 DIAGNOSIS — Z67.91 RH NEGATIVE STATE IN ANTEPARTUM PERIOD: ICD-10-CM

## 2022-12-02 DIAGNOSIS — Z3A.31 31 WEEKS GESTATION OF PREGNANCY: Primary | ICD-10-CM

## 2022-12-02 PROCEDURE — 99213 OFFICE O/P EST LOW 20 MIN: CPT | Performed by: OBSTETRICS & GYNECOLOGY

## 2022-12-02 PROCEDURE — G8484 FLU IMMUNIZE NO ADMIN: HCPCS | Performed by: OBSTETRICS & GYNECOLOGY

## 2022-12-02 PROCEDURE — G8427 DOCREV CUR MEDS BY ELIG CLIN: HCPCS | Performed by: OBSTETRICS & GYNECOLOGY

## 2022-12-02 PROCEDURE — 1036F TOBACCO NON-USER: CPT | Performed by: OBSTETRICS & GYNECOLOGY

## 2022-12-02 PROCEDURE — G8419 CALC BMI OUT NRM PARAM NOF/U: HCPCS | Performed by: OBSTETRICS & GYNECOLOGY

## 2022-12-02 NOTE — PROGRESS NOTES
Aimee Rivas is a 24 y.o. female 27w3d        OB History    Para Term  AB Living   2       1 0   SAB IAB Ectopic Molar Multiple Live Births   1                # Outcome Date GA Lbr Zbigniew/2nd Weight Sex Delivery Anes PTL Lv   2 Current            1 SAB 2021               Vitals  BP: 114/70  Weight: 170 lb (77.1 kg)  Heart Rate: 92  Patient Position: Sitting  Albumin: Negative  Glucose: Negative      The patient was seen and evaluated. There was positive fetal movements. No contractions or leakage of fluid. Signs and symptoms of  labor as well as labor were reviewed. The S/S of Pre-Eclampsia were reviewed with the patient in detail. She is to report any of these if they occur. She currently denies any of these. The patient had her 28 week labs completed.   Hospital Outpatient Visit on 11/10/2022   Component Date Value Ref Range Status    WBC 11/10/2022 8.8  3.5 - 11.0 k/uL Final    RBC 11/10/2022 4.35  4.0 - 5.2 m/uL Final    Hemoglobin 11/10/2022 13.1  12.0 - 16.0 g/dL Final    Hematocrit 11/10/2022 38.8  36 - 46 % Final    MCV 11/10/2022 89.2  80 - 100 fL Final    MCH 11/10/2022 30.0  26 - 34 pg Final    MCHC 11/10/2022 33.7  31 - 37 g/dL Final    RDW 11/10/2022 13.9  11.5 - 14.9 % Final    Platelets  252  150 - 450 k/uL Final    MPV 11/10/2022 8.9  6.0 - 12.0 fL Final    Seg Neutrophils 11/10/2022 73 (A)  36 - 66 % Final    Lymphocytes 11/10/2022 18 (A)  24 - 44 % Final    Monocytes 11/10/2022 7  1 - 7 % Final    Eosinophils % 11/10/2022 1  0 - 4 % Final    Basophils 11/10/2022 1  0 - 2 % Final    Segs Absolute 11/10/2022 6.40  1.3 - 9.1 k/uL Final    Absolute Lymph # 11/10/2022 1.60  1.0 - 4.8 k/uL Final    Absolute Mono # 11/10/2022 0.60  0.1 - 1.3 k/uL Final    Absolute Eos # 11/10/2022 0.10  0.0 - 0.4 k/uL Final    Basophils Absolute 11/10/2022 0.10  0.0 - 0.2 k/uL Final    Color, UA 11/10/2022 Yellow  Yellow Final    Turbidity UA 11/10/2022 Turbid (A)  Clear Final    Glucose, Ur 11/10/2022 NEGATIVE  NEGATIVE Final    Bilirubin Urine 11/10/2022 NEGATIVE  NEGATIVE Final    Ketones, Urine 11/10/2022 TRACE (A)  NEGATIVE Final    Specific [place], UA 11/10/2022 1.028  1.000 - 1.030 Final    Urine Hgb 11/10/2022 NEGATIVE  NEGATIVE Final    pH, UA 11/10/2022 7.0  5.0 - 8.0 Final    Protein, UA 11/10/2022 TRACE (A)  NEGATIVE Final    Urobilinogen, Urine 11/10/2022 Normal  Normal Final    Nitrite, Urine 11/10/2022 NEGATIVE  NEGATIVE Final    Leukocyte Esterase, Urine 11/10/2022 MOD (A)  NEGATIVE Final    Product Code 11/10/2022 MANUFACTURED PRODUCT   Final    Number of Units 11/10/2022 1   Final    ABO/Rh 11/10/2022 A NEGATIVE   Final    Antibody Screen 11/10/2022 NEGATIVE   Final    History Check 11/10/2022    Final                    Value:A  NEGATIVE      Unit Number 11/10/2022 [id number]   Final    Product Code 11/10/2022 RHIG   Final    Unit Divison 11/10/2022 00   Final    Status of Units 11/10/2022 TRANSFUSED   Final    Transfusion Status 11/10/2022 OK TO TRANSFUSE   Final    WBC, UA 11/10/2022 10 TO 20  /HPF Final    RBC, UA 11/10/2022 3 to 5  /HPF Final    Casts UA 11/10/2022 3 to 5  /LPF Final    Epithelial Cells UA 11/10/2022 10 TO 20  /HPF Final    Mucus, UA 11/10/2022 1+ (A)  None Final    Amorphous, UA 11/10/2022 1+ (A)  None Final    Yeast, UA 11/10/2022 FEW (A)  None Final    Specimen Description 11/10/2022 . CLEAN CATCH URINE   Final    Culture 11/10/2022 NO SIGNIFICANT GROWTH   Final   ]     10/11/22 Tdap @ 27 week gest   10/11/22 Pt declined Flu Vacc  7/20/22 PRAF form completed       T-Dap Vaccine (27-36 weeks): awaiting    The patient was instructed on fetal kick counts and was given a kick sheet to complete every 8 hours.  She was instructed that the baby should move at a minimum of ten times within one hour after a meal. The patient was instructed to lay down on her left side twenty minutes after eating and count movements for up to one hour with a target value of ten movements. She was instructed to notify the office if she did not make that target after two attempts or if after any attempt there was less than four movements. The patient reports that the targets have been made Yes.  Testing:  Not indicated  The recommendation to proceed to fetal kick counts every 8 hours daily instead of Non stress testing, (as per Robinson VALVERDE, HERMAN Rollins guidance for Covid-19 testing, American Journal of Obstetrics & Gynecology, 8431)    Assessment:  1Jordyn Benito is a 24 y.o. female  2.   3. 31w1d    Patient Active Problem List    Diagnosis Date Noted    Rh negative state in antepartum period 2022     Priority: High     Needs rhogam at 28 weeks      Abnormal GTT (glucose tolerance test) 2022     Priority: High     3 hour and a1c ordered      Family history of diabetes mellitus 2022     Priority: High     Early 1 hour GTT ordered      History of miscarriage 2022     Priority: High    Cramping affecting pregnancy, antepartum 10/23/2022     Priority: Medium    THC use 2022     Priority: Medium    Chlamydial infection 2021     Priority: Medium    Gonorrhea 2021     Priority: Medium        Diagnosis Orders   1. 31 weeks gestation of pregnancy        2. Abnormal GTT (glucose tolerance test)        3. Family history of diabetes mellitus        4. History of miscarriage        5. Rh negative state in antepartum period                  Plan:  The patient will return to the office for her next visit in 2 weeks. See antepartum flow sheet.  Testing Indicated: No  Scheduled with Nursing-Pt notified: No      Patient was seen with total face to face time of 20 minutes. More than 50% of this visit was on counseling and education regarding her    Diagnosis Orders   1. 31 weeks gestation of pregnancy        2.  Abnormal GTT (glucose tolerance test)        3. Family history of diabetes mellitus        4. History of miscarriage        5. Rh negative state in antepartum period         and her options. She was also counseled on her preventative health maintenance recommendations and follow-up.

## 2022-12-13 ENCOUNTER — HOSPITAL ENCOUNTER (OUTPATIENT)
Age: 22
Discharge: HOME | End: 2022-12-14
Attending: OBSTETRICS & GYNECOLOGY | Admitting: OBSTETRICS & GYNECOLOGY
Payer: MEDICAID

## 2022-12-13 VITALS — HEART RATE: 88 BPM | TEMPERATURE: 96.8 F | SYSTOLIC BLOOD PRESSURE: 102 MMHG | DIASTOLIC BLOOD PRESSURE: 69 MMHG

## 2022-12-13 PROBLEM — Z3A.32 32 WEEKS GESTATION OF PREGNANCY: Status: ACTIVE | Noted: 2022-12-13

## 2022-12-13 LAB
BILIRUBIN URINE: ABNORMAL
CANDIDA SPECIES, DNA PROBE: NEGATIVE
COLOR: ABNORMAL
GARDNERELLA VAGINALIS, DNA PROBE: NEGATIVE
GLUCOSE URINE: NEGATIVE
KETONES, URINE: ABNORMAL
LEUKOCYTE ESTERASE, URINE: ABNORMAL
NITRITE, URINE: NEGATIVE
PH UA: 5.5 (ref 5–8)
PROTEIN UA: ABNORMAL
SOURCE: NORMAL
SPECIFIC GRAVITY UA: 1.03 (ref 1–1.03)
TRICHOMONAS VAGINALIS DNA: NEGATIVE
TURBIDITY: ABNORMAL
URINE HGB: ABNORMAL
UROBILINOGEN, URINE: NORMAL

## 2022-12-13 PROCEDURE — 87591 N.GONORRHOEAE DNA AMP PROB: CPT

## 2022-12-13 PROCEDURE — 87510 GARDNER VAG DNA DIR PROBE: CPT

## 2022-12-13 PROCEDURE — 87660 TRICHOMONAS VAGIN DIR PROBE: CPT

## 2022-12-13 PROCEDURE — 87491 CHLMYD TRACH DNA AMP PROBE: CPT

## 2022-12-13 PROCEDURE — 81001 URINALYSIS AUTO W/SCOPE: CPT

## 2022-12-13 PROCEDURE — 87480 CANDIDA DNA DIR PROBE: CPT

## 2022-12-13 RX ORDER — ACETAMINOPHEN 500 MG
1000 TABLET ORAL EVERY 6 HOURS PRN
Status: DISCONTINUED | OUTPATIENT
Start: 2022-12-13 | End: 2022-12-14 | Stop reason: HOSPADM

## 2022-12-13 RX ORDER — ONDANSETRON 2 MG/ML
4 INJECTION INTRAMUSCULAR; INTRAVENOUS EVERY 6 HOURS PRN
Status: DISCONTINUED | OUTPATIENT
Start: 2022-12-13 | End: 2022-12-14 | Stop reason: HOSPADM

## 2022-12-13 RX ORDER — ONDANSETRON 4 MG/1
4 TABLET, ORALLY DISINTEGRATING ORAL EVERY 8 HOURS PRN
Status: DISCONTINUED | OUTPATIENT
Start: 2022-12-13 | End: 2022-12-14 | Stop reason: HOSPADM

## 2022-12-14 LAB
AMORPHOUS: ABNORMAL
BACTERIA: ABNORMAL
C TRACH DNA GENITAL QL NAA+PROBE: NEGATIVE
CASTS UA: ABNORMAL /LPF (ref 0–8)
EPITHELIAL CELLS UA: ABNORMAL /HPF (ref 0–5)
MUCUS: ABNORMAL
N. GONORRHOEAE DNA: NEGATIVE
RBC UA: ABNORMAL /HPF (ref 0–4)
SPECIMEN DESCRIPTION: NORMAL
WBC UA: ABNORMAL /HPF (ref 0–5)

## 2022-12-14 PROCEDURE — 99213 OFFICE O/P EST LOW 20 MIN: CPT

## 2022-12-14 RX ORDER — CEPHALEXIN 500 MG/1
500 CAPSULE ORAL 4 TIMES DAILY
Qty: 28 CAPSULE | Refills: 0 | Status: SHIPPED | OUTPATIENT
Start: 2022-12-14 | End: 2022-12-21

## 2022-12-14 NOTE — H&P
hearing loss  Breast: Negative breast abnormalities, negative breast lumps, negative nipple discharge  Respiratory: negative dyspnea, negative cough, negative SOB  Cardiovascular: negative chest pain,  negative palpitations, negative arrhythmia, negative syncope   Gastrointestinal: negative abdominal pain, negative RUQ pain, negative N/V, negative diarrhea, negative constipation, negative bowel changes, negative heartburn   Genitourinary: negative dysuria, negative hematuria, negative urinary incontinence, negative vaginal discharge, + vaginal bleeding or spotting  Dermatological: negative rash, negative pruritis, negative mole or other skin changes  Hematologic: negative bruising  Immunologic/Lymphatic: negative recent illness, negative recent sick contact  Musculoskeletal: negative back pain, negative myalgias, negative arthralgias  Neurological:  negative dizziness, negative migraines, negative seizures, negative weakness  Behavior/Psych: negative depression, negative anxiety, negative SI, negative HI    OBSTETRIC HISTORY:   OB History    Para Term  AB Living   2 0 0 0 1 0   SAB IAB Ectopic Molar Multiple Live Births   1 0 0 0 0 0      # Outcome Date GA Lbr Zbigniew/2nd Weight Sex Delivery Anes PTL Lv   2 Current            1 SAB 2021               PAST MEDICAL HISTORY:   has a past medical history of Anxiety, Depression, and Depression. PAST SURGICAL HISTORY:   has a past surgical history that includes Tonsillectomy and Tympanostomy tube placement. ALLERGIES:  has No Known Allergies. MEDICATIONS:  Prior to Admission medications    Medication Sig Start Date End Date Taking?  Authorizing Provider   cephALEXin (KEFLEX) 500 MG capsule Take 1 capsule by mouth 4 times daily for 7 days 22 Yes Perla Betancourt MD   Prenatal Vit-Fe Fumarate-FA (PNV PRENATAL PLUS MULTIVITAMIN) 27-1 MG TABS Take 1 tablet by mouth daily 22  MONO Godoy - NP       FAMILY HISTORY:  family history includes Diabetes in her maternal grandfather, maternal grandmother, and mother; Heart Disease in her maternal grandfather; Hypertension in her maternal grandfather and maternal grandmother. SOCIAL HISTORY:   reports that she has never smoked. She has never used smokeless tobacco. She reports that she does not currently use alcohol. She reports that she does not currently use drugs after having used the following drugs: Marijuana [place]). VITALS:  Vitals:    12/13/22 2144 12/13/22 2343   BP: 102/69    Pulse: (!) 113 88   Temp: 96.8 °F (36 °C)    TempSrc: Oral            PHYSICAL EXAM:  Fetal Heart Monitor:  Baseline Heart Rate 125, moderate variability, present accelerations, absent decelerations  [place]: contractions absent  General appearance:  no apparent distress, alert and cooperative  HEENT: head atraumatic, normocephalic, trachea midline, moist mucous membranes    Neurologic:  oriented, normal speech, no focal findings or movement disorder noted  Lungs:  no increased work of breathing, good air exchange. No use of accessory muscle, no cyanosis.   Heart:  regular rate, no pitting edema, no cyanosis  Abdomen:  soft, gravid, non-tender on palpation, no right upper quadrant tenderness, uterus non-tender, no signs of abruption and no signs of chorioamnionitis  Extremities:  no calf tenderness bilaterally, non-edematous bilaterally   Musculoskeletal: no gross abnormalities, range of motion appropriate for age   Psychiatric: mood appropriate, normal affect     Rectal Exam: not indicated  Pelvic Exam:   Chaperone for Intimate Exam: Chaperone was present for entire exam, Chaperone Name: Maurisio Merino RN  Sterile Speculum Exam:   Urethral meatus: normal appearing   Vulva: Normal hair distribution, normal appearing vulva, no masses, tenderness or lesions, normal clitoris   Vagina: Normal appearing vaginal mucosa without lesions, minimal physiological vaginal discharge noted in the posterior vault, no lacerations. No evidence of blood or active bleeding in vaginal vault. Cervix: Normal appearing cervix without lesions, external os visibly closed, no lacerations or abnormal lesions visualized      DATA:  Membranes Ruptured: No    LIMITED BEDSIDE US:  Position: Cephalic  Placental Location: Anterior  Fetal Heart Tones: Present  Fetal Movement: Present   Fetal Tone: Present  Fetal Breathing movements: Present  Amniotic Fluid Index/Volume:  adequate > 2x2 cm fluid pocket  Biophysical Profile:     PRENATAL LAB RESULTS:   Blood Type/Rh: A neg  Antibody Screen: negative  Hemoglobin, Hematocrit, Platelets: 65.3/47.7/991  Rubella: immune  T. Pallidum, IgG: non-reactive  Hepatitis B Surface Antigen: non-reactive   Hepatitis C Antibody: not done   HIV: non-reactive   Gonorrhea: Collected today  Chlamydia: Collected today  Urine culture: positive, date: 22, negative, date: 11/10/22    Early 1 hour Glucose Tolerance Test:  134  Early 3 hour Glucose Tolerance Test: Fasting 88/ 1hr 127/ 2 hr 94/ 3 hr 53  3 hour Glucose Tolerance Test: Ordered not completed    Group B Strep: Not done  Cystic Fibrosis Screen: negative  Sickle Cell Screen: not done  First Trimester Screen: not done  MSAFP: negative  Non-Invasive Prenatal Testing: not done  Anatomy US: Anterior placenta, 3VC, female gender, normal anatomy      ASSESSMENT & PLAN:  Diane Osorio is a 20 y.o. female  at [address] [place] [address],[facility]   - GBS unknown / Rh negative / R immune   - No indication for GBS prophylaxis    Decreased fetal movement   - Patient complaining of decreased fetal movement since this morning   - cEFM/TOCO - Cat 1 tracing, absent contractions   - BPP 8/8, present fetal tone, present fetal movement, present heart tones, present breathing movements, adequate > 2 x 2 centimeter MVP. No concerns at this time.   - SSE: Cervix appears closed, no active discharge from cervical os.   Vaginal vault showing minimal physiological discharge of pregnancy, no blood or residual blood. No lacerations are present. No lesions could be noted on vaginal mucosa, cervix, or external genitalia.   - Gc/C and vaginitis probe collected at time of SSE. Patient will be contacted with results of Gc/C cultures if positive, vaginitis probe negative. - UA ordered, showing moderate bacteria   - Patient provided with Keflex prescription for UTI, will be contacted with results of urine culture if Keflex is insufficient for coverage.   - Overall reassuring maternal and fetal status  - Patient may be discharged to home. Patient counseled on adequate PO hydration and fetal kick counts. All questions and concerns addressed. Patient is aware that she should return to the hospital if she has worsening contractions, LOF, VB or decreased fetal movements. She voices understanding. Patient is aware that she should return to hospital if she experiences unrelenting headache, vision changes, RUQ pain, nausea, vomiting, and peripheral edema. She voices understanding. Postcoital Bleeding   - Patient denies active bleeding at this time   - Noted light brown discharge prior to presenting to triage, has since subsided   - SSE performed, no findings of bleeding from cervical os or blood or blood residue in the vaginal vault. Lesions cannot be identified on the vaginal mucosa or cervix. - Patient given return precautions, encouraged to present back to hospital if she notices increase in bleeding, or other concerning symptoms.    - Low clinical suspicion for pathological etiology, likely normal spotting after intercourse      Hx of miscarriage   - G1 pregnancy, no indication that patient required D&C procedure      Abnormal GTT   - Early 1 hour , early 3-hour GTT within normal limits   - 3-hour GTT ordered, patient did not complete      Hx Chlamydia/Gonorrhea   - Remote history, noted in problems list 8/2021   - Gonorrhea and chlamydia swabs collected during SSE today      THC use   - Social work consult postpartum if positive urine drug screen on admission during delivery      Fhx of DM   - Refer to early 1 hour and 3-hour GTT results      BMI 31      Patient Active Problem List    Diagnosis Date Noted    Rh negative state in antepartum period 08/05/2022     Priority: High     Needs rhogam at 28 weeks      Abnormal GTT (glucose tolerance test) 08/04/2022     Priority: High     3 hour and a1c ordered      Family history of diabetes mellitus 08/03/2022     Priority: High     Early 1 hour GTT ordered      History of miscarriage 06/16/2022     Priority: High    32 weeks gestation of pregnancy 12/13/2022     Priority: Medium    Cramping affecting pregnancy, antepartum 10/23/2022     Priority: Medium    THC use 06/16/2022     Priority: Medium    Chlamydial infection 08/24/2021     Priority: Medium    Gonorrhea 08/24/2021     Priority: Medium       Plan discussed with Dr. Carmen Hopper, who is agreeable. Steroids given this admission: No    Risks, benefits, alternatives and possible complications have been discussed in detail with the patient. Admission, and post admission procedures and expectations were discussed in detail. All questions were answered.     Attending's Name: Dr. Mei Fonseca MD  Ob/Gyn Resident  12/14/2022, 9:00 AM

## 2022-12-14 NOTE — DISCHARGE INSTRUCTIONS
Kayla Celis physician if you experience: Dizziness or severe headache  Spots before eyes or blurred vision    Chills and or fever  Vaginal pressure  Epigastric pain  Uterine contractions are regular and 5 minutes apart if 1st baby or 10 minutes apart if not 1st baby  Bag or nuñez leaking or gush of fluid from vagina  Any vaginal bleeding that is heavier than a menstrual period  Intermittent low backache  Vomiting or diarrhea for several hours  Decrease or absence of baby movement  Fetal movement card instructions given  Right sided abdominal pain  Increase or change in vaginal discharge  Abdominal or menstrual like cramping that is constant or heavier, comes and goes  Swelling of face, hands, legs or feet not decreased with rest on left side.

## 2022-12-14 NOTE — FLOWSHEET NOTE
Pt arrived with complaints of decreased fetal movement since about pm. Pt also stated she has had sexual intercourse and started noticing brown blood. Pt denies any leakage of fluid, or contractions. Pt placed on EFM. Heart tones audible.

## 2022-12-16 ENCOUNTER — ROUTINE PRENATAL (OUTPATIENT)
Dept: OBGYN CLINIC | Age: 22
End: 2022-12-16

## 2022-12-16 VITALS
SYSTOLIC BLOOD PRESSURE: 102 MMHG | DIASTOLIC BLOOD PRESSURE: 60 MMHG | WEIGHT: 172 LBS | BODY MASS INDEX: 31.65 KG/M2 | OXYGEN SATURATION: 98 % | HEART RATE: 85 BPM | HEIGHT: 62 IN

## 2022-12-16 DIAGNOSIS — Z67.91 RH NEGATIVE STATE IN ANTEPARTUM PERIOD: ICD-10-CM

## 2022-12-16 DIAGNOSIS — Z83.3 FAMILY HISTORY OF DIABETES MELLITUS: ICD-10-CM

## 2022-12-16 DIAGNOSIS — O26.843 UTERINE SIZE-DATE DISCREPANCY IN THIRD TRIMESTER: ICD-10-CM

## 2022-12-16 DIAGNOSIS — R73.09 ABNORMAL GTT (GLUCOSE TOLERANCE TEST): Primary | ICD-10-CM

## 2022-12-16 DIAGNOSIS — Z3A.33 33 WEEKS GESTATION OF PREGNANCY: ICD-10-CM

## 2022-12-16 DIAGNOSIS — Z87.59 HISTORY OF MISCARRIAGE: ICD-10-CM

## 2022-12-16 DIAGNOSIS — O26.899 RH NEGATIVE STATE IN ANTEPARTUM PERIOD: ICD-10-CM

## 2022-12-16 NOTE — PROGRESS NOTES
Dede Hamilton is a 20 y.o. female 33w1d        OB History    Para Term  AB Living   2       1 0   SAB IAB Ectopic Molar Multiple Live Births   1                # Outcome Date GA Lbr Zbigniew/2nd Weight Sex Delivery Anes PTL Lv   2 Current            1 SAB 2021               Vitals  BP: 102/60  Weight: 172 lb (78 kg)  Heart Rate: 85  Fundal Height (cm): 33 cm  Fetal HR: 150  Movement: Present      The patient was seen and evaluated. There was positive fetal movements. No contractions or leakage of fluid. Signs and symptoms of  labor as well as labor were reviewed. The S/S of Pre-Eclampsia were reviewed with the patient in detail. She is to report any of these if they occur. She currently denies any of these. The patient had her 28 week labs completed. Admission on 2022, Discharged on 2022   Component Date Value Ref Range Status    Specimen Description 2022 . CERVIX   Final    C. trachomatis DNA 2022 NEGATIVE  NEGATIVE Final    Comment: CHLAMYDIA TRACHOMATIS DNA not detected by nucleic acid amplification. This test is intended for medical purposes only and is not valid for the evaluation of   suspected sexual abuse or for other forensic purposes. In certain contexts, culture may be required to meet applicable laws and regulations for   diagnosis of C. trachomatis and N. gonorrhoeae infections. Per 2014  CDC recommendations, this test does not include confirmation of positive results   by an alternative nucleic acid target. N. gonorrhoeae DNA 2022 NEGATIVE  NEGATIVE Final    Comment: NEISSERIA GONORRHOEAE DNA not detected by nucleic acid amplification. This test is intended for medical purposes only and is not valid for the evaluation of   suspected sexual abuse or for other forensic purposes.   In certain contexts, culture may be required to meet applicable laws and regulations for   diagnosis of C. trachomatis and N. gonorrhoeae infections. Per 2014  CDC recommendations, this test does not include confirmation of positive results   by an alternative nucleic acid target. Source 12/13/2022 . VAGINAL SWAB   Final    Trichomonas Vaginalis DNA 12/13/2022 NEGATIVE  NEGATIVE Final    for Trichomonas Vaginalis    Gardnerella Vaginalis, DNA Probe 12/13/2022 NEGATIVE  NEGATIVE Final    for Gardnerella vaginalis    Candida Species, DNA Probe 12/13/2022 NEGATIVE  NEGATIVE Final    Comment: for Candida sp. Method of testing is a DNA probe intended for detection and identification of Candida   species, Gardnerella vaginalis, and Trichomonas vaginalis nucleic acid in vaginal fluid   specimens from patients with symptoms of vaginitis/vaginosis. Color, UA 12/13/2022 Dark Yellow (A)  Yellow Final    Turbidity UA 12/13/2022 Turbid (A)  Clear Final    Glucose, Ur 12/13/2022 NEGATIVE  NEGATIVE Final    Bilirubin Urine 12/13/2022 NEGATIVE  Verified by ictotest. (A)  NEGATIVE Final    Ketones, Urine 12/13/2022 MODERATE (A)  NEGATIVE Final    Specific Gravity, UA 12/13/2022 1.028  1.005 - 1.030 Final    Urine Hgb 12/13/2022 TRACE (A)  NEGATIVE Final    pH, UA 12/13/2022 5.5  5.0 - 8.0 Final    Protein, UA 12/13/2022 1+ (A)  NEGATIVE Final    Urobilinogen, Urine 12/13/2022 Normal  Normal Final    Nitrite, Urine 12/13/2022 NEGATIVE  NEGATIVE Final    Leukocyte Esterase, Urine 12/13/2022 TRACE (A)  NEGATIVE Final    WBC, UA 12/13/2022 5 TO 10  0 - 5 /HPF Final    RBC, UA 12/13/2022 0 TO 2  0 - 4 /HPF Final    Reference range defined for non-centrifuged specimen. Casts UA 12/13/2022 0 TO 2 HYALINE Reference range defined for non-centrifuged specimen.   0 - 8 /LPF Final    Epithelial Cells UA 12/13/2022 10 TO 20  0 - 5 /HPF Final    Bacteria, UA 12/13/2022 MODERATE (A)  None Final    Mucus, UA 12/13/2022 1+ (A)  None Final    Amorphous, UA 12/13/2022 1+ (A)  None Final   ]     10/11/22 Tdap @ 27 week gest   10/11/22 Pt declined Flu Vacc  22 PRAF form completed     T-Dap Vaccine (27-36 weeks): completed    The patient was instructed on fetal kick counts and was given a kick sheet to complete every 8 hours. She was instructed that the baby should move at a minimum of ten times within one hour after a meal. The patient was instructed to lay down on her left side twenty minutes after eating and count movements for up to one hour with a target value of ten movements. She was instructed to notify the office if she did not make that target after two attempts or if after any attempt there was less than four movements. The patient reports that the targets have been made Yes.  Testing:  Not indicated     Assessment:  1. Donny Esquivel is a 20 y.o. female  2.   3. 33w1d    Patient Active Problem List    Diagnosis Date Noted    Rh negative state in antepartum period 2022     Priority: High     Needs rhogam at 28 weeks      Abnormal GTT (glucose tolerance test) 2022     Priority: High     3 hour and a1c ordered      Family history of diabetes mellitus 2022     Priority: High     Early 1 hour GTT ordered      History of miscarriage 2022     Priority: High    32 weeks gestation of pregnancy 2022     Priority: Medium    Cramping affecting pregnancy, antepartum 10/23/2022     Priority: Medium    THC use 2022     Priority: Medium    Chlamydial infection 2021     Priority: Medium    Gonorrhea 2021     Priority: Medium        Diagnosis Orders   1. Abnormal GTT (glucose tolerance test)        2. Family history of diabetes mellitus        3. History of miscarriage        4. Rh negative state in antepartum period        5. 33 weeks gestation of pregnancy        6. Uterine size-date discrepancy in third trimester  US OB FOLLOW UP TRANSABDOMINAL APPROACH                Plan:  The patient will return to the office for her next visit in 2 weeks. See antepartum flow sheet.    Reviewed s/sx of  labor and preeclampsia  Continue FKCs  Growth u/s ordered        Testing Indicated: Yes  Scheduled with Nursing-Pt notified: Yes      Patient was seen with total face to face time of 20 minutes. More than 50% of this visit was on counseling and education regarding her    Diagnosis Orders   1. Abnormal GTT (glucose tolerance test)        2. Family history of diabetes mellitus        3. History of miscarriage        4. Rh negative state in antepartum period        5. 33 weeks gestation of pregnancy        6. Uterine size-date discrepancy in third trimester  US OB FOLLOW UP TRANSABDOMINAL APPROACH       and her options. She was also counseled on her preventative health maintenance recommendations and follow-up.

## 2022-12-30 ENCOUNTER — ROUTINE PRENATAL (OUTPATIENT)
Dept: OBGYN CLINIC | Age: 22
End: 2022-12-30
Payer: MEDICARE

## 2022-12-30 VITALS
WEIGHT: 163 LBS | DIASTOLIC BLOOD PRESSURE: 58 MMHG | SYSTOLIC BLOOD PRESSURE: 100 MMHG | HEART RATE: 97 BPM | BODY MASS INDEX: 29.81 KG/M2

## 2022-12-30 DIAGNOSIS — O26.843 UTERINE SIZE-DATE DISCREPANCY IN THIRD TRIMESTER: ICD-10-CM

## 2022-12-30 DIAGNOSIS — Z83.3 FAMILY HISTORY OF DIABETES MELLITUS: ICD-10-CM

## 2022-12-30 DIAGNOSIS — R73.09 ABNORMAL GTT (GLUCOSE TOLERANCE TEST): ICD-10-CM

## 2022-12-30 DIAGNOSIS — O09.93 HIGH-RISK PREGNANCY IN THIRD TRIMESTER: Primary | ICD-10-CM

## 2022-12-30 DIAGNOSIS — O26.899 RH NEGATIVE STATE IN ANTEPARTUM PERIOD: ICD-10-CM

## 2022-12-30 DIAGNOSIS — Z3A.35 35 WEEKS GESTATION OF PREGNANCY: ICD-10-CM

## 2022-12-30 DIAGNOSIS — Z87.59 HISTORY OF MISCARRIAGE: ICD-10-CM

## 2022-12-30 DIAGNOSIS — Z67.91 RH NEGATIVE STATE IN ANTEPARTUM PERIOD: ICD-10-CM

## 2022-12-30 PROCEDURE — G8419 CALC BMI OUT NRM PARAM NOF/U: HCPCS | Performed by: NURSE PRACTITIONER

## 2022-12-30 PROCEDURE — 99213 OFFICE O/P EST LOW 20 MIN: CPT | Performed by: NURSE PRACTITIONER

## 2022-12-30 PROCEDURE — 1036F TOBACCO NON-USER: CPT | Performed by: NURSE PRACTITIONER

## 2022-12-30 PROCEDURE — G8427 DOCREV CUR MEDS BY ELIG CLIN: HCPCS | Performed by: NURSE PRACTITIONER

## 2022-12-30 PROCEDURE — G8484 FLU IMMUNIZE NO ADMIN: HCPCS | Performed by: NURSE PRACTITIONER

## 2022-12-30 NOTE — PROGRESS NOTES
Lit Larose is a 20 y.o. female 35w1d        OB History    Para Term  AB Living   2       1 0   SAB IAB Ectopic Molar Multiple Live Births   1                # Outcome Date GA Lbr Zbigniew/2nd Weight Sex Delivery Anes PTL Lv   2 Current            1 SAB 2021               Vitals  BP: (!) 100/58  Weight: 163 lb (73.9 kg)  Heart Rate: 97  Patient Position: Sitting  Albumin: Negative  Glucose: Negative      The patient was seen and evaluated. There was positive fetal movements. No contractions or leakage of fluid. Signs and symptoms of  labor as well as labor were reviewed. The S/S of Pre-Eclampsia were reviewed with the patient in detail. She is to report any of these if they occur. She currently denies any of these. The patient had her 28 week labs ordered. Admission on 2022, Discharged on 2022   Component Date Value Ref Range Status    Specimen Description 2022 . CERVIX   Final    C. trachomatis DNA 2022 NEGATIVE  NEGATIVE Final    Comment: CHLAMYDIA TRACHOMATIS DNA not detected by nucleic acid amplification. This test is intended for medical purposes only and is not valid for the evaluation of   suspected sexual abuse or for other forensic purposes. In certain contexts, culture may be required to meet applicable laws and regulations for   diagnosis of C. trachomatis and N. gonorrhoeae infections. Per 2014  CDC recommendations, this test does not include confirmation of positive results   by an alternative nucleic acid target.  N. gonorrhoeae DNA 2022 NEGATIVE  NEGATIVE Final    Comment: NEISSERIA GONORRHOEAE DNA not detected by nucleic acid amplification. This test is intended for medical purposes only and is not valid for the evaluation of   suspected sexual abuse or for other forensic purposes.   In certain contexts, culture may be required to meet applicable laws and regulations for   diagnosis of C. trachomatis and N. gonorrhoeae infections. Per 2014  CDC recommendations, this test does not include confirmation of positive results   by an alternative nucleic acid target.  Source 12/13/2022 . VAGINAL SWAB   Final    Trichomonas Vaginalis DNA 12/13/2022 NEGATIVE  NEGATIVE Final    for Trichomonas Vaginalis    Gardnerella Vaginalis, DNA Probe 12/13/2022 NEGATIVE  NEGATIVE Final    for Gardnerella vaginalis    Candida Species, DNA Probe 12/13/2022 NEGATIVE  NEGATIVE Final    Comment: for Candida sp. Method of testing is a DNA probe intended for detection and identification of Candida   species, Gardnerella vaginalis, and Trichomonas vaginalis nucleic acid in vaginal fluid   specimens from patients with symptoms of vaginitis/vaginosis.  Color, UA 12/13/2022 Dark Yellow (A)  Yellow Final    Turbidity UA 12/13/2022 Turbid (A)  Clear Final    Glucose, Ur 12/13/2022 NEGATIVE  NEGATIVE Final    Bilirubin Urine 12/13/2022 NEGATIVE  Verified by ictotest. (A)  NEGATIVE Final    Ketones, Urine 12/13/2022 MODERATE (A)  NEGATIVE Final    Specific [place], UA 12/13/2022 1.028  1.005 - 1.030 Final    Urine Hgb 12/13/2022 TRACE (A)  NEGATIVE Final    pH, UA 12/13/2022 5.5  5.0 - 8.0 Final    Protein, UA 12/13/2022 1+ (A)  NEGATIVE Final    Urobilinogen, Urine 12/13/2022 Normal  Normal Final    Nitrite, Urine 12/13/2022 NEGATIVE  NEGATIVE Final    Leukocyte Esterase, Urine 12/13/2022 TRACE (A)  NEGATIVE Final    WBC, UA 12/13/2022 5 TO 10  0 - 5 /HPF Final    RBC, UA 12/13/2022 0 TO 2  0 - 4 /HPF Final    Reference range defined for non-centrifuged specimen.  Casts UA 12/13/2022 0 TO 2 HYALINE Reference range defined for non-centrifuged specimen.   0 - 8 /LPF Final    Epithelial Cells UA 12/13/2022 10 TO 20  0 - 5 /HPF Final    Bacteria, UA 12/13/2022 MODERATE (A)  None Final    Mucus, UA 12/13/2022 1+ (A)  None Final    Amorphous, UA 12/13/2022 1+ (A)  None Final   ]     10/11/22 Tdap @ 27 week gest   10/11/22 Pt declined Flu Vacc  22 PRAF form completed       T-Dap Vaccine (27-36 weeks): awaiting    The patient was instructed on fetal kick counts and was given a kick sheet to complete every 8 hours. She was instructed that the baby should move at a minimum of ten times within one hour after a meal. The patient was instructed to lay down on her left side twenty minutes after eating and count movements for up to one hour with a target value of ten movements. She was instructed to notify the office if she did not make that target after two attempts or if after any attempt there was less than four movements. The patient reports that the targets have been made Yes.  Testing:  Not indicated    Assessment:  1Jordyn Berg is a 20 y.o. female  2.   3. 35w1d    Patient Active Problem List    Diagnosis Date Noted    Rh negative state in antepartum period 2022     Priority: High     Needs rhogam at 28 weeks      Abnormal GTT (glucose tolerance test) 2022     Priority: High     3 hour and a1c ordered      Family history of diabetes mellitus 2022     Priority: High     Early 1 hour GTT ordered      History of miscarriage 2022     Priority: High    32 weeks gestation of pregnancy 2022     Priority: Medium    Cramping affecting pregnancy, antepartum 10/23/2022     Priority: Medium    THC use 2022     Priority: Medium    Chlamydial infection 2021     Priority: Medium    Gonorrhea 2021     Priority: Medium        Diagnosis Orders   1. High-risk pregnancy in third trimester        2. Abnormal GTT (glucose tolerance test)  Glucose Tolerance, 3 Hours    Hemoglobin A1C      3. Family history of diabetes mellitus        4. History of miscarriage        5. Rh negative state in antepartum period        6. 35 weeks gestation of pregnancy  Glucose Tolerance, 3 Hours    Hemoglobin A1C      7.  Uterine size-date discrepancy in third trimester  US OB 1 or More Fetus Limited - Clinic Performed                Plan:  The patient will return to the office for her next visit in 1 weeks. See antepartum flow sheet. Fetal growth ultrasound ordered in our office  Lab slip reprinted for 3 hour GTT and Hgb a1c reprinted and encouraged to complete.  Testing Indicated: No  Scheduled with Nursing-Pt notified: No      Patient was seen with total face to face time of 20 minutes. More than 50% of this visit was on counseling and education regarding her    Diagnosis Orders   1. High-risk pregnancy in third trimester        2. Abnormal GTT (glucose tolerance test)  Glucose Tolerance, 3 Hours    Hemoglobin A1C      3. Family history of diabetes mellitus        4. History of miscarriage        5. Rh negative state in antepartum period        6. 35 weeks gestation of pregnancy  Glucose Tolerance, 3 Hours    Hemoglobin A1C      7. Uterine size-date discrepancy in third trimester  US OB 1 or More Fetus Limited - Clinic Performed       and her options. She was also counseled on her preventative health maintenance recommendations and follow-up.

## 2023-01-04 ENCOUNTER — PROCEDURE VISIT (OUTPATIENT)
Dept: OBGYN CLINIC | Age: 23
End: 2023-01-04
Payer: MEDICARE

## 2023-01-04 DIAGNOSIS — O26.843 UTERINE SIZE-DATE DISCREPANCY IN THIRD TRIMESTER: ICD-10-CM

## 2023-01-04 LAB
ABDOMINAL CIRCUMFERENCE: NORMAL
BIPARIETAL DIAMETER: NORMAL
ESTIMATED FETAL WEIGHT: NORMAL
FEMORAL DIAMETER: NORMAL
FEMORAL LENGTH: NORMAL
HC/AC: NORMAL
HEAD CIRCUMFERENCE: NORMAL

## 2023-01-04 PROCEDURE — 76815 OB US LIMITED FETUS(S): CPT | Performed by: OBSTETRICS & GYNECOLOGY

## 2023-01-04 PROCEDURE — 99999 PR OFFICE/OUTPT VISIT,PROCEDURE ONLY: CPT | Performed by: OBSTETRICS & GYNECOLOGY

## 2023-01-05 ENCOUNTER — TELEPHONE (OUTPATIENT)
Dept: OBGYN CLINIC | Age: 23
End: 2023-01-05

## 2023-01-05 NOTE — TELEPHONE ENCOUNTER
----- Message from MONO Robbins NP sent at 1/5/2023  3:10 PM EST -----  5 lbs 15 oz 39.4 % tile  Refer to [facility] for [facility], [facility]. in 10th %tile

## 2023-01-06 ENCOUNTER — ROUTINE PRENATAL (OUTPATIENT)
Dept: OBGYN CLINIC | Age: 23
End: 2023-01-06

## 2023-01-06 ENCOUNTER — HOSPITAL ENCOUNTER (OUTPATIENT)
Age: 23
Setting detail: SPECIMEN
Discharge: HOME | End: 2023-01-06

## 2023-01-06 VITALS
HEART RATE: 90 BPM | WEIGHT: 166 LBS | BODY MASS INDEX: 30.36 KG/M2 | SYSTOLIC BLOOD PRESSURE: 102 MMHG | DIASTOLIC BLOOD PRESSURE: 70 MMHG

## 2023-01-06 DIAGNOSIS — Z87.59 HISTORY OF MISCARRIAGE: ICD-10-CM

## 2023-01-06 DIAGNOSIS — O26.899 RH NEGATIVE STATE IN ANTEPARTUM PERIOD: ICD-10-CM

## 2023-01-06 DIAGNOSIS — Z3A.36 36 WEEKS GESTATION OF PREGNANCY: ICD-10-CM

## 2023-01-06 DIAGNOSIS — Z3A.36 36 WEEKS GESTATION OF PREGNANCY: Primary | ICD-10-CM

## 2023-01-06 DIAGNOSIS — Z83.3 FAMILY HISTORY OF DIABETES MELLITUS: ICD-10-CM

## 2023-01-06 DIAGNOSIS — O09.93 SUPERVISION OF HIGH RISK PREGNANCY IN THIRD TRIMESTER: Primary | ICD-10-CM

## 2023-01-06 DIAGNOSIS — R73.09 ABNORMAL GTT (GLUCOSE TOLERANCE TEST): ICD-10-CM

## 2023-01-06 DIAGNOSIS — Z67.91 RH NEGATIVE STATE IN ANTEPARTUM PERIOD: ICD-10-CM

## 2023-01-06 RX ORDER — ONDANSETRON 4 MG/1
4 TABLET, ORALLY DISINTEGRATING ORAL EVERY 8 HOURS PRN
Qty: 30 TABLET | Refills: 1 | Status: SHIPPED | OUTPATIENT
Start: 2023-01-06 | End: 2023-01-26

## 2023-01-06 RX ORDER — PANTOPRAZOLE SODIUM 20 MG/1
20 TABLET, DELAYED RELEASE ORAL
Qty: 90 TABLET | Refills: 1 | Status: SHIPPED | OUTPATIENT
Start: 2023-01-06

## 2023-01-06 NOTE — PROGRESS NOTES
Dona Oneal is a 20 y.o. female 36w1d        OB History    Para Term  AB Living   2       1 0   SAB IAB Ectopic Molar Multiple Live Births   1                # Outcome Date GA Lbr Zbigniew/2nd Weight Sex Delivery Anes PTL Lv   2 Current            1 SAB 2021                 Vitals  BP: 102/70  Weight: 166 lb (75.3 kg)  Heart Rate: 90  Patient Position: Sitting  Albumin: Trace  Glucose: Negative      The patient was seen and evaluated. There was positive fetal movements. No contractions or leakage of fluid. Signs and symptoms of labor were reviewed. The S/S of Pre-Eclampsia were reviewed with the patient in detail. She is to report any of these if they occur. She currently denies any of these. The patient was instructed on fetal kick counts and was given a kick sheet to complete every 8 hours. She was instructed that the baby should move at a minimum of ten times within one hour after a meal. The patient was instructed to lay down on her left side twenty minutes after eating and count movements for up to one hour with a target value of ten movements. She was instructed to notify the office if she did not make that target after two attempts or if after any attempt there was less than four movements. The patient reports that the targets have been made Yes. 10/11/22 Tdap @ 27 week gest   10/11/22 Pt declined Flu Vacc  22 PRAF form completed       T-Dap Vaccine (27-36 weeks) Completed: No    Allergies: Allergies as of 2023    (No Known Allergies)       Group Beta Strep collection was completed. Yes   GBS Results:   No visits with results within 1 Week(s) from this visit. Latest known visit with results is:   Admission on 2022, Discharged on 2022   Component Date Value Ref Range Status    Specimen Description 2022 . CERVIX   Final    C. trachomatis DNA 2022 NEGATIVE  NEGATIVE Final    Comment: CHLAMYDIA TRACHOMATIS DNA not detected by nucleic acid amplification. This test is intended for medical purposes only and is not valid for the evaluation of   suspected sexual abuse or for other forensic purposes. In certain contexts, culture may be required to meet applicable laws and regulations for   diagnosis of C. trachomatis and N. gonorrhoeae infections. Per 2014  CDC recommendations, this test does not include confirmation of positive results   by an alternative nucleic acid target.  N. gonorrhoeae DNA 12/13/2022 NEGATIVE  NEGATIVE Final    Comment: NEISSERIA GONORRHOEAE DNA not detected by nucleic acid amplification. This test is intended for medical purposes only and is not valid for the evaluation of   suspected sexual abuse or for other forensic purposes. In certain contexts, culture may be required to meet applicable laws and regulations for   diagnosis of C. trachomatis and N. gonorrhoeae infections. Per 2014  CDC recommendations, this test does not include confirmation of positive results   by an alternative nucleic acid target.  Source 12/13/2022 . VAGINAL SWAB   Final    Trichomonas Vaginalis DNA 12/13/2022 NEGATIVE  NEGATIVE Final    for Trichomonas Vaginalis    Gardnerella Vaginalis, DNA Probe 12/13/2022 NEGATIVE  NEGATIVE Final    for Gardnerella vaginalis    Candida Species, DNA Probe 12/13/2022 NEGATIVE  NEGATIVE Final    Comment: for Candida sp. Method of testing is a DNA probe intended for detection and identification of Candida   species, Gardnerella vaginalis, and Trichomonas vaginalis nucleic acid in vaginal fluid   specimens from patients with symptoms of vaginitis/vaginosis.       Color, UA 12/13/2022 Dark Yellow (A)  Yellow Final    Turbidity UA 12/13/2022 Turbid (A)  Clear Final    Glucose, Ur 12/13/2022 NEGATIVE  NEGATIVE Final    Bilirubin Urine 12/13/2022 NEGATIVE  Verified by ictotest. (A)  NEGATIVE Final    Ketones, Urine 12/13/2022 MODERATE (A)  NEGATIVE Final    Specific [place], UA 2022 1.028  1.005 - 1.030 Final    Urine Hgb 2022 TRACE (A)  NEGATIVE Final    pH, UA 2022 5.5  5.0 - 8.0 Final    Protein, UA 2022 1+ (A)  NEGATIVE Final    Urobilinogen, Urine 2022 Normal  Normal Final    Nitrite, Urine 2022 NEGATIVE  NEGATIVE Final    Leukocyte Esterase, Urine 2022 TRACE (A)  NEGATIVE Final    WBC, UA 2022 5 TO 10  0 - 5 /HPF Final    RBC, UA 2022 0 TO 2  0 - 4 /HPF Final    Reference range defined for non-centrifuged specimen.  Casts UA 2022 0 TO 2 HYALINE Reference range defined for non-centrifuged specimen. 0 - 8 /LPF Final    Epithelial Cells UA 2022 10 TO 20  0 - 5 /HPF Final    Bacteria, UA 2022 MODERATE (A)  None Final    Mucus, UA 2022 1+ (A)  None Final    Amorphous, UA 2022 1+ (A)  None Final   ]  Sensitivities for clindamycin and erythromycin were ordered. No      The patient was counseled on the mandatory call ahead policy. She has been instructed to call the office at anytime prior to going into the hospital so the on-call physician may direct her to the appropriate facility for care. Exceptions were reviewed including but not limited to: Decreased fetal movement, vaginal Bleeding or hemorrhage, trauma, readily expectant delivery, or any instance where she feels 911 should be utilized. The patient was counseled on Labor & Delivery. yes  Route of delivery and counseling on vaginal, operative vaginal, and  sections were completed with the risks of each to both the patient as well as her baby. The possibility of a blood transfusion was discussed as well. The patient was not opposed to receiving a transfusion if needed. The patient was counseled on types of analgesia during labor and is considering either Regional or IV medication the risks, benefits and alternatives were discussed.  Testing:  Awaiting [facility] recommendations. Assessment:  1.  Emily Mena is a 20 y.o. female  2.   3. 36w1d      Patient Active Problem List    Diagnosis Date Noted    Rh negative state in antepartum period 2022     Priority: High     Overview Note:     Needs rhogam at 28 weeks      Abnormal GTT (glucose tolerance test) 2022     Priority: High     Overview Note:     3 hour and a1c ordered      Family history of diabetes mellitus 2022     Priority: High     Overview Note:     Early 1 hour GTT ordered      History of miscarriage 2022     Priority: High    32 weeks gestation of pregnancy 2022     Priority: Medium    Cramping affecting pregnancy, antepartum 10/23/2022     Priority: Medium    THC use 2022     Priority: Medium    Chlamydial infection 2021     Priority: Medium    Gonorrhea 2021     Priority: Medium        Diagnosis Orders   1. 36 weeks gestation of pregnancy  Culture, Strep B Screen, Vaginal/Rectal    ondansetron (ZOFRAN-ODT) 4 MG disintegrating tablet    pantoprazole (PROTONIX) 20 MG tablet      2. Abnormal GTT (glucose tolerance test)        3. Family history of diabetes mellitus        4. History of miscarriage        5. Rh negative state in antepartum period                  Plan:  The patient will return to the office for her next visit in 1 weeks. See antepartum flow sheet. Referral to [facility] for previous US in office- decreased head circumference. Denies S/S of labor. Script for zofran and pantaprozole for N/V. Call office or go to ER if symptoms continue. Counseling on Fetal Movement Kick Counts: The patient was instructed on fetal kick counts and was given a kick sheet to complete every 8 hours. She was instructed that the baby should move at a minimum of ten times within one hour after a meal. The patient was instructed to lay down on her left side twenty minutes after eating and count movements for up to one hour with a target value of ten movements.   She was instructed to notify the office if she did not make that target after two attempts or if after any attempt there was less than four movements. The patient reports that the targets have been made. Patient was seen with total face to face time of 20 minutes. More than 50% of this visit was on counseling and education regarding her    Diagnosis Orders   1. 36 weeks gestation of pregnancy  Culture, Strep B Screen, Vaginal/Rectal    ondansetron (ZOFRAN-ODT) 4 MG disintegrating tablet    pantoprazole (PROTONIX) 20 MG tablet      2. Abnormal GTT (glucose tolerance test)        3. Family history of diabetes mellitus        4. History of miscarriage        5. Rh negative state in antepartum period         and her options. She was also counseled on her preventative health maintenance recommendations and follow-up.

## 2023-01-07 LAB
CULTURE: NORMAL
SPECIMEN DESCRIPTION: NORMAL

## 2023-01-13 ENCOUNTER — ROUTINE PRENATAL (OUTPATIENT)
Dept: PERINATAL CARE | Age: 23
End: 2023-01-13

## 2023-01-13 VITALS
WEIGHT: 169.97 LBS | HEIGHT: 62 IN | DIASTOLIC BLOOD PRESSURE: 70 MMHG | SYSTOLIC BLOOD PRESSURE: 101 MMHG | TEMPERATURE: 97.6 F | RESPIRATION RATE: 16 BRPM | HEART RATE: 84 BPM | BODY MASS INDEX: 31.28 KG/M2

## 2023-01-13 DIAGNOSIS — O99.213 OBESITY AFFECTING PREGNANCY IN THIRD TRIMESTER: ICD-10-CM

## 2023-01-13 DIAGNOSIS — Z13.89 ENCOUNTER FOR ROUTINE SCREENING FOR MALFORMATION USING ULTRASONICS: ICD-10-CM

## 2023-01-13 DIAGNOSIS — O35.07X0 FETAL MICROCEPHALY AFFECTING ANTEPARTUM CARE OF MOTHER, SINGLE OR UNSPECIFIED FETUS: Primary | ICD-10-CM

## 2023-01-13 DIAGNOSIS — Z3A.37 37 WEEKS GESTATION OF PREGNANCY: ICD-10-CM

## 2023-01-13 DIAGNOSIS — O99.810 ABNORMAL MATERNAL GLUCOSE TOLERANCE, ANTEPARTUM: ICD-10-CM

## 2023-01-13 DIAGNOSIS — Z36.4 ULTRASOUND FOR ANTENATAL SCREENING FOR FETAL GROWTH RESTRICTION: ICD-10-CM

## 2023-01-13 DIAGNOSIS — Z03.73 SUSPECTED FETAL ANOMALY NOT FOUND: ICD-10-CM

## 2023-01-13 PROBLEM — Z3A.32 32 WEEKS GESTATION OF PREGNANCY: Status: RESOLVED | Noted: 2022-12-13 | Resolved: 2023-01-13

## 2023-01-13 ASSESSMENT — PAIN DESCRIPTION - LOCATION: LOCATION: ABDOMEN

## 2023-01-13 ASSESSMENT — PAIN SCALES - GENERAL: PAINLEVEL_OUTOF10: 6

## 2023-01-17 ENCOUNTER — ROUTINE PRENATAL (OUTPATIENT)
Dept: OBGYN CLINIC | Age: 23
End: 2023-01-17
Payer: MEDICARE

## 2023-01-17 VITALS
WEIGHT: 177 LBS | BODY MASS INDEX: 32.37 KG/M2 | DIASTOLIC BLOOD PRESSURE: 72 MMHG | SYSTOLIC BLOOD PRESSURE: 112 MMHG | HEART RATE: 93 BPM

## 2023-01-17 DIAGNOSIS — O09.93 HIGH-RISK PREGNANCY IN THIRD TRIMESTER: Primary | ICD-10-CM

## 2023-01-17 DIAGNOSIS — Z67.91 RH NEGATIVE STATE IN ANTEPARTUM PERIOD: ICD-10-CM

## 2023-01-17 DIAGNOSIS — Z83.3 FAMILY HISTORY OF DIABETES MELLITUS: ICD-10-CM

## 2023-01-17 DIAGNOSIS — O26.899 RH NEGATIVE STATE IN ANTEPARTUM PERIOD: ICD-10-CM

## 2023-01-17 DIAGNOSIS — Z87.59 HISTORY OF MISCARRIAGE: ICD-10-CM

## 2023-01-17 DIAGNOSIS — R73.09 ABNORMAL GTT (GLUCOSE TOLERANCE TEST): ICD-10-CM

## 2023-01-17 DIAGNOSIS — Z3A.37 37 WEEKS GESTATION OF PREGNANCY: ICD-10-CM

## 2023-01-17 PROCEDURE — G8427 DOCREV CUR MEDS BY ELIG CLIN: HCPCS | Performed by: OBSTETRICS & GYNECOLOGY

## 2023-01-17 PROCEDURE — G8419 CALC BMI OUT NRM PARAM NOF/U: HCPCS | Performed by: OBSTETRICS & GYNECOLOGY

## 2023-01-17 PROCEDURE — 99213 OFFICE O/P EST LOW 20 MIN: CPT | Performed by: OBSTETRICS & GYNECOLOGY

## 2023-01-17 PROCEDURE — G8484 FLU IMMUNIZE NO ADMIN: HCPCS | Performed by: OBSTETRICS & GYNECOLOGY

## 2023-01-17 PROCEDURE — 1036F TOBACCO NON-USER: CPT | Performed by: OBSTETRICS & GYNECOLOGY

## 2023-01-17 NOTE — PROGRESS NOTES
Eduardo Kennedy is a 20 y.o. female 37w6d        OB History    Para Term  AB Living   2       1 0   SAB IAB Ectopic Molar Multiple Live Births   1                # Outcome Date GA Lbr Zbigniew/2nd Weight Sex Delivery Anes PTL Lv   2 Current            1 SAB 2021               Vitals  BP: 112/72  Weight: 177 lb (80.3 kg)  Heart Rate: 93  Patient Position: Sitting  Albumin: Negative  Glucose: Negative      The patient was seen and evaluated. There was positive fetal movements. No contractions or leakage of fluid. Signs and symptoms of labor were reviewed. The S/S of Pre-Eclampsia were reviewed with the patient in detail. She is to report any of these if they occur. She currently denies any of these. The patient was instructed on fetal kick counts and was given a kick sheet to complete every 8 hours. She was instructed that the baby should move at a minimum of ten times within one hour after a meal. The patient was instructed to lay down on her left side twenty minutes after eating and count movements for up to one hour with a target value of ten movements. She was instructed to notify the office if she did not make that target after two attempts or if after any attempt there was less than four movements. The patient reports that the targets have been made Yes. 10/11/22 Tdap @ 27 week gest   10/11/22 Pt declined Flu Vacc  22 PRAF form completed       T-Dap Vaccine Completed (27-36 weeks): No    Allergies: Allergies as of 2023    (No Known Allergies)         Group Beta Strep collection was completed. Yes  GBS Results:   Hospital Outpatient Visit on 2023   Component Date Value Ref Range Status    Specimen Description 2023 . VAGINA   Final    Culture 2023 NEGATIVE FOR GROUP B STREPTOCOCCI   Final   ]        Cervical Exam was:   1/60%/-1/V/I cm dilated    The literature regarding a questionable link to pitocin augmentation and induction of labor, the assistance of labor contractions and the initiation of contractions to help delivery, have been reviewed with the patient regarding the increased potential of having a  with Attention Deficit Hyperactivity Disorder and or Autism. These two disorders and the ramifications of their impact on a child and the family caring for that child has been reviewed with the patient in detail. She was given the risks, benefits and alternatives of the use of this medication. She has agreed to its use in the delivery of her unborn child if needed at the time of delivery, Yes.      The patient was counseled on the mandatory call ahead policy. She has been instructed to call the office at anytime prior to going into the hospital so the on-call physician may direct her to the appropriate facility for care. Exceptions were reviewed including but not limited to: Decreased fetal movement, vaginal Bleeding or hemorrhage, trauma, readily expectant delivery, or any instance where she feels 911 should be utilized.      The patient was counseled on Labor & Delivery.   Route of delivery and counseling on vaginal, operative vaginal, and  sections were completed with the risks of each to both the patient as well as her baby. The possibility of a blood transfusion was discussed as well. The patient was not opposed to receiving a transfusion if needed. The patient was counseled on types of analgesia during labor and is considering either Regional or IV medication the risks, benefits and alternatives were discussed.      Testing:  Not indicated  The recommendation to proceed to fetal kick counts every 8 hours daily instead of Non stress testing, (as per Robinson RC, Rocky G, Alvin F, Vaibhav SHERIFF, MFM guidance for Covid-19 testing, American Journal of Obstetrics & Gynecology, 2020)        Assessment:  1. Emily Eaton is a 22 y.o. female  2.   3. 37w5d    Patient Active Problem List    Diagnosis Date Noted   • Rh  negative state in antepartum period 08/05/2022     Priority: High     Overview Note:     Needs rhogam at 28 weeks      Abnormal GTT (glucose tolerance test) 08/04/2022     Priority: High     Overview Note:     3 hour and a1c ordered      Family history of diabetes mellitus 08/03/2022     Priority: High     Overview Note:     Early 1 hour GTT ordered      History of miscarriage 06/16/2022     Priority: High    Cramping affecting pregnancy, antepartum 10/23/2022     Priority: Medium    THC use 06/16/2022     Priority: Medium    Chlamydial infection 08/24/2021     Priority: Medium    Gonorrhea 08/24/2021     Priority: Medium        Diagnosis Orders   1. High-risk pregnancy in third trimester        2. Abnormal GTT (glucose tolerance test)        3. Family history of diabetes mellitus        4. History of miscarriage        5. Rh negative state in antepartum period        6. 37 weeks gestation of pregnancy                Plan:  The patient will return to the office for her next visit in 1 weeks. See antepartum flow sheet. Patient was seen with total face to face time of 20 minutes. More than 50% of this visit was on counseling and education regarding her    Diagnosis Orders   1. High-risk pregnancy in third trimester        2. Abnormal GTT (glucose tolerance test)        3. Family history of diabetes mellitus        4. History of miscarriage        5. Rh negative state in antepartum period        6. 37 weeks gestation of pregnancy         and her options. She was also counseled on her preventative health maintenance recommendations and follow-up.

## 2023-01-24 ENCOUNTER — ANESTHESIA EVENT (OUTPATIENT)
Dept: LABOR AND DELIVERY | Age: 23
DRG: 540 | End: 2023-01-24
Payer: MEDICARE

## 2023-01-24 ENCOUNTER — ANESTHESIA (OUTPATIENT)
Dept: LABOR AND DELIVERY | Age: 23
DRG: 540 | End: 2023-01-24
Payer: MEDICARE

## 2023-01-24 ENCOUNTER — ROUTINE PRENATAL (OUTPATIENT)
Dept: OBGYN CLINIC | Age: 23
End: 2023-01-24
Payer: MEDICARE

## 2023-01-24 ENCOUNTER — HOSPITAL ENCOUNTER (INPATIENT)
Age: 23
LOS: 2 days | Discharge: HOME | DRG: 540 | End: 2023-01-26
Attending: OBSTETRICS & GYNECOLOGY | Admitting: OBSTETRICS & GYNECOLOGY
Payer: MEDICARE

## 2023-01-24 VITALS
BODY MASS INDEX: 30.91 KG/M2 | WEIGHT: 169 LBS | HEART RATE: 90 BPM | DIASTOLIC BLOOD PRESSURE: 80 MMHG | SYSTOLIC BLOOD PRESSURE: 106 MMHG

## 2023-01-24 DIAGNOSIS — Z83.3 FAMILY HISTORY OF DIABETES MELLITUS: ICD-10-CM

## 2023-01-24 DIAGNOSIS — Z3A.38 38 WEEKS GESTATION OF PREGNANCY: Primary | ICD-10-CM

## 2023-01-24 DIAGNOSIS — R73.09 ABNORMAL GTT (GLUCOSE TOLERANCE TEST): ICD-10-CM

## 2023-01-24 DIAGNOSIS — Z87.59 HISTORY OF MISCARRIAGE: ICD-10-CM

## 2023-01-24 DIAGNOSIS — Z67.91 RH NEGATIVE STATE IN ANTEPARTUM PERIOD: ICD-10-CM

## 2023-01-24 DIAGNOSIS — O26.899 RH NEGATIVE STATE IN ANTEPARTUM PERIOD: ICD-10-CM

## 2023-01-24 DIAGNOSIS — G89.18 POST-OP PAIN: Primary | ICD-10-CM

## 2023-01-24 PROBLEM — O09.90 HIGH-RISK PREGNANCY, UNSPECIFIED TRIMESTER: Status: RESOLVED | Noted: 2023-01-24 | Resolved: 2023-01-24

## 2023-01-24 PROBLEM — O09.90 HIGH-RISK PREGNANCY, UNSPECIFIED TRIMESTER: Status: ACTIVE | Noted: 2023-01-24

## 2023-01-24 PROBLEM — R10.9 CRAMPING AFFECTING PREGNANCY, ANTEPARTUM: Status: RESOLVED | Noted: 2022-10-23 | Resolved: 2023-01-24

## 2023-01-24 LAB
ABO/RH: NORMAL
ABSOLUTE EOS #: 0.05 K/UL (ref 0–0.44)
ABSOLUTE IMMATURE GRANULOCYTE: 0.05 K/UL (ref 0–0.3)
ABSOLUTE LYMPH #: 1.5 K/UL (ref 1.1–3.7)
ABSOLUTE MONO #: 0.58 K/UL (ref 0.1–1.2)
AMPHETAMINE SCREEN URINE: NEGATIVE
ANTIBODY IDENTIFICATION: NORMAL
ANTIBODY SCREEN: POSITIVE
ARM BAND NUMBER: NORMAL
BARBITURATE SCREEN URINE: NEGATIVE
BASOPHILS # BLD: 1 % (ref 0–2)
BASOPHILS ABSOLUTE: 0.05 K/UL (ref 0–0.2)
BENZODIAZEPINE SCREEN, URINE: NEGATIVE
CANNABINOID SCREEN URINE: NEGATIVE
COCAINE METABOLITE, URINE: NEGATIVE
EOSINOPHILS RELATIVE PERCENT: 1 % (ref 1–4)
EXPIRATION DATE: NORMAL
FENTANYL URINE: NEGATIVE
HCT VFR BLD CALC: 40 % (ref 36.3–47.1)
HEMOGLOBIN: 13.3 G/DL (ref 11.9–15.1)
IMMATURE GRANULOCYTES: 1 %
LYMPHOCYTES # BLD: 19 % (ref 24–43)
MCH RBC QN AUTO: 28.7 PG (ref 25.2–33.5)
MCHC RBC AUTO-ENTMCNC: 33.3 G/DL (ref 28.4–34.8)
MCV RBC AUTO: 86.2 FL (ref 82.6–102.9)
METHADONE SCREEN, URINE: NEGATIVE
MONOCYTES # BLD: 7 % (ref 3–12)
NRBC AUTOMATED: 0 PER 100 WBC
OPIATES, URINE: NEGATIVE
OXYCODONE SCREEN URINE: NEGATIVE
PDW BLD-RTO: 13.8 % (ref 11.8–14.4)
PHENCYCLIDINE, URINE: NEGATIVE
PLATELET # BLD: 229 K/UL (ref 138–453)
PMV BLD AUTO: 12.2 FL (ref 8.1–13.5)
RBC # BLD: 4.64 M/UL (ref 3.95–5.11)
SEG NEUTROPHILS: 71 % (ref 36–65)
SEGMENTED NEUTROPHILS ABSOLUTE COUNT: 5.65 K/UL (ref 1.5–8.1)
T. PALLIDUM, IGG: NONREACTIVE
TEST INFORMATION: NORMAL
WBC # BLD: 7.9 K/UL (ref 3.5–11.3)

## 2023-01-24 PROCEDURE — 10907ZC DRAINAGE OF AMNIOTIC FLUID, THERAPEUTIC FROM PRODUCTS OF CONCEPTION, VIA NATURAL OR ARTIFICIAL OPENING: ICD-10-PCS | Performed by: OBSTETRICS & GYNECOLOGY

## 2023-01-24 PROCEDURE — G8427 DOCREV CUR MEDS BY ELIG CLIN: HCPCS | Performed by: NURSE PRACTITIONER

## 2023-01-24 PROCEDURE — 85025 COMPLETE CBC W/AUTO DIFF WBC: CPT

## 2023-01-24 PROCEDURE — 3700000025 EPIDURAL BLOCK: Performed by: ANESTHESIOLOGY

## 2023-01-24 PROCEDURE — 2500000003 HC RX 250 WO HCPCS

## 2023-01-24 PROCEDURE — 1036F TOBACCO NON-USER: CPT | Performed by: NURSE PRACTITIONER

## 2023-01-24 PROCEDURE — 86870 RBC ANTIBODY IDENTIFICATION: CPT

## 2023-01-24 PROCEDURE — 99213 OFFICE O/P EST LOW 20 MIN: CPT | Performed by: NURSE PRACTITIONER

## 2023-01-24 PROCEDURE — 2580000003 HC RX 258: Performed by: STUDENT IN AN ORGANIZED HEALTH CARE EDUCATION/TRAINING PROGRAM

## 2023-01-24 PROCEDURE — 86901 BLOOD TYPING SEROLOGIC RH(D): CPT

## 2023-01-24 PROCEDURE — 86850 RBC ANTIBODY SCREEN: CPT

## 2023-01-24 PROCEDURE — 6360000002 HC RX W HCPCS: Performed by: NURSE ANESTHETIST, CERTIFIED REGISTERED

## 2023-01-24 PROCEDURE — 86900 BLOOD TYPING SEROLOGIC ABO: CPT

## 2023-01-24 PROCEDURE — 6360000002 HC RX W HCPCS: Performed by: STUDENT IN AN ORGANIZED HEALTH CARE EDUCATION/TRAINING PROGRAM

## 2023-01-24 PROCEDURE — 10H07YZ INSERTION OF OTHER DEVICE INTO PRODUCTS OF CONCEPTION, VIA NATURAL OR ARTIFICIAL OPENING: ICD-10-PCS | Performed by: OBSTETRICS & GYNECOLOGY

## 2023-01-24 PROCEDURE — 86780 TREPONEMA PALLIDUM: CPT

## 2023-01-24 PROCEDURE — G8484 FLU IMMUNIZE NO ADMIN: HCPCS | Performed by: NURSE PRACTITIONER

## 2023-01-24 PROCEDURE — 3E033VJ INTRODUCTION OF OTHER HORMONE INTO PERIPHERAL VEIN, PERCUTANEOUS APPROACH: ICD-10-PCS | Performed by: OBSTETRICS & GYNECOLOGY

## 2023-01-24 PROCEDURE — 80307 DRUG TEST PRSMV CHEM ANLYZR: CPT

## 2023-01-24 PROCEDURE — 6360000002 HC RX W HCPCS: Performed by: ANESTHESIOLOGY

## 2023-01-24 PROCEDURE — 1220000000 HC SEMI PRIVATE OB R&B

## 2023-01-24 PROCEDURE — 2500000003 HC RX 250 WO HCPCS: Performed by: NURSE ANESTHETIST, CERTIFIED REGISTERED

## 2023-01-24 PROCEDURE — G8419 CALC BMI OUT NRM PARAM NOF/U: HCPCS | Performed by: NURSE PRACTITIONER

## 2023-01-24 RX ORDER — NALOXONE HYDROCHLORIDE 0.4 MG/ML
INJECTION, SOLUTION INTRAMUSCULAR; INTRAVENOUS; SUBCUTANEOUS PRN
Status: DISCONTINUED | OUTPATIENT
Start: 2023-01-24 | End: 2023-01-25

## 2023-01-24 RX ORDER — ONDANSETRON 4 MG/1
4 TABLET, ORALLY DISINTEGRATING ORAL EVERY 8 HOURS PRN
Status: DISCONTINUED | OUTPATIENT
Start: 2023-01-24 | End: 2023-01-25

## 2023-01-24 RX ORDER — ONDANSETRON 2 MG/ML
4 INJECTION INTRAMUSCULAR; INTRAVENOUS EVERY 6 HOURS PRN
Status: DISCONTINUED | OUTPATIENT
Start: 2023-01-24 | End: 2023-01-25

## 2023-01-24 RX ORDER — ONDANSETRON 4 MG/1
4 TABLET, ORALLY DISINTEGRATING ORAL EVERY 8 HOURS PRN
Status: DISCONTINUED | OUTPATIENT
Start: 2023-01-24 | End: 2023-01-24 | Stop reason: SDUPTHER

## 2023-01-24 RX ORDER — EPHEDRINE SULFATE 50 MG/ML
INJECTION INTRAVENOUS
Status: DISCONTINUED
Start: 2023-01-24 | End: 2023-01-24 | Stop reason: WASHOUT

## 2023-01-24 RX ORDER — SODIUM CHLORIDE, SODIUM LACTATE, POTASSIUM CHLORIDE, AND CALCIUM CHLORIDE .6; .31; .03; .02 G/100ML; G/100ML; G/100ML; G/100ML
500 INJECTION, SOLUTION INTRAVENOUS PRN
Status: DISCONTINUED | OUTPATIENT
Start: 2023-01-24 | End: 2023-01-25

## 2023-01-24 RX ORDER — ROPIVACAINE HYDROCHLORIDE 2 MG/ML
INJECTION, SOLUTION EPIDURAL; INFILTRATION; PERINEURAL PRN
Status: DISCONTINUED | OUTPATIENT
Start: 2023-01-24 | End: 2023-01-25 | Stop reason: SDUPTHER

## 2023-01-24 RX ORDER — MORPHINE SULFATE 10 MG/ML
10 INJECTION, SOLUTION INTRAMUSCULAR; INTRAVENOUS ONCE
Status: COMPLETED | OUTPATIENT
Start: 2023-01-24 | End: 2023-01-24

## 2023-01-24 RX ORDER — ACETAMINOPHEN 325 MG/1
650 TABLET ORAL EVERY 4 HOURS PRN
Status: DISCONTINUED | OUTPATIENT
Start: 2023-01-24 | End: 2023-01-24 | Stop reason: SDUPTHER

## 2023-01-24 RX ORDER — SODIUM CHLORIDE, SODIUM LACTATE, POTASSIUM CHLORIDE, AND CALCIUM CHLORIDE .6; .31; .03; .02 G/100ML; G/100ML; G/100ML; G/100ML
1000 INJECTION, SOLUTION INTRAVENOUS PRN
Status: DISCONTINUED | OUTPATIENT
Start: 2023-01-24 | End: 2023-01-25

## 2023-01-24 RX ORDER — SODIUM CHLORIDE, SODIUM LACTATE, POTASSIUM CHLORIDE, CALCIUM CHLORIDE 600; 310; 30; 20 MG/100ML; MG/100ML; MG/100ML; MG/100ML
INJECTION, SOLUTION INTRAVENOUS CONTINUOUS
Status: DISCONTINUED | OUTPATIENT
Start: 2023-01-24 | End: 2023-01-25

## 2023-01-24 RX ORDER — SODIUM CHLORIDE 9 MG/ML
25 INJECTION, SOLUTION INTRAVENOUS PRN
Status: DISCONTINUED | OUTPATIENT
Start: 2023-01-24 | End: 2023-01-25

## 2023-01-24 RX ORDER — SODIUM CHLORIDE 0.9 % (FLUSH) 0.9 %
5-40 SYRINGE (ML) INJECTION EVERY 12 HOURS SCHEDULED
Status: DISCONTINUED | OUTPATIENT
Start: 2023-01-24 | End: 2023-01-25 | Stop reason: HOSPADM

## 2023-01-24 RX ORDER — ROPIVACAINE HYDROCHLORIDE 2 MG/ML
INJECTION, SOLUTION EPIDURAL; INFILTRATION; PERINEURAL
Status: COMPLETED
Start: 2023-01-24 | End: 2023-01-24

## 2023-01-24 RX ORDER — DIPHENHYDRAMINE HCL 25 MG
25 TABLET ORAL EVERY 4 HOURS PRN
Status: DISCONTINUED | OUTPATIENT
Start: 2023-01-24 | End: 2023-01-25

## 2023-01-24 RX ORDER — EPHEDRINE SULFATE 50 MG/ML
10 INJECTION INTRAVENOUS ONCE
Status: COMPLETED | OUTPATIENT
Start: 2023-01-24 | End: 2023-01-24

## 2023-01-24 RX ORDER — ACETAMINOPHEN 500 MG
1000 TABLET ORAL EVERY 6 HOURS PRN
Status: DISCONTINUED | OUTPATIENT
Start: 2023-01-24 | End: 2023-01-25

## 2023-01-24 RX ORDER — PROCHLORPERAZINE EDISYLATE 5 MG/ML
10 INJECTION INTRAMUSCULAR; INTRAVENOUS ONCE
Status: COMPLETED | OUTPATIENT
Start: 2023-01-24 | End: 2023-01-24

## 2023-01-24 RX ORDER — ONDANSETRON 2 MG/ML
4 INJECTION INTRAMUSCULAR; INTRAVENOUS EVERY 6 HOURS PRN
Status: DISCONTINUED | OUTPATIENT
Start: 2023-01-24 | End: 2023-01-24

## 2023-01-24 RX ORDER — ACETAMINOPHEN 500 MG
1000 TABLET ORAL EVERY 6 HOURS
Status: DISCONTINUED | OUTPATIENT
Start: 2023-01-24 | End: 2023-01-25

## 2023-01-24 RX ORDER — ONDANSETRON 2 MG/ML
4 INJECTION INTRAMUSCULAR; INTRAVENOUS EVERY 6 HOURS PRN
Status: DISCONTINUED | OUTPATIENT
Start: 2023-01-24 | End: 2023-01-24 | Stop reason: SDUPTHER

## 2023-01-24 RX ORDER — SODIUM CHLORIDE 0.9 % (FLUSH) 0.9 %
5-40 SYRINGE (ML) INJECTION PRN
Status: DISCONTINUED | OUTPATIENT
Start: 2023-01-24 | End: 2023-01-25 | Stop reason: HOSPADM

## 2023-01-24 RX ORDER — LIDOCAINE HYDROCHLORIDE AND EPINEPHRINE 15; 5 MG/ML; UG/ML
INJECTION, SOLUTION EPIDURAL PRN
Status: DISCONTINUED | OUTPATIENT
Start: 2023-01-24 | End: 2023-01-25 | Stop reason: SDUPTHER

## 2023-01-24 RX ORDER — EPHEDRINE SULFATE 50 MG/ML
INJECTION INTRAVENOUS
Status: COMPLETED
Start: 2023-01-24 | End: 2023-01-24

## 2023-01-24 RX ADMIN — SODIUM CHLORIDE, POTASSIUM CHLORIDE, SODIUM LACTATE AND CALCIUM CHLORIDE: 600; 310; 30; 20 INJECTION, SOLUTION INTRAVENOUS at 15:43

## 2023-01-24 RX ADMIN — PROCHLORPERAZINE EDISYLATE 10 MG: 5 INJECTION INTRAMUSCULAR; INTRAVENOUS at 17:00

## 2023-01-24 RX ADMIN — MORPHINE SULFATE 10 MG: 10 INJECTION INTRAVENOUS at 17:00

## 2023-01-24 RX ADMIN — ROPIVACAINE HYDROCHLORIDE 6 ML: 2 INJECTION, SOLUTION EPIDURAL; INFILTRATION; PERINEURAL at 21:21

## 2023-01-24 RX ADMIN — EPHEDRINE SULFATE 10 MG: 50 INJECTION, SOLUTION INTRAVENOUS at 23:21

## 2023-01-24 RX ADMIN — SODIUM CHLORIDE, POTASSIUM CHLORIDE, SODIUM LACTATE AND CALCIUM CHLORIDE 1000 ML: 600; 310; 30; 20 INJECTION, SOLUTION INTRAVENOUS at 20:33

## 2023-01-24 RX ADMIN — LIDOCAINE HYDROCHLORIDE,EPINEPHRINE BITARTRATE 2 ML: 15; .005 INJECTION, SOLUTION EPIDURAL; INFILTRATION; INTRACAUDAL; PERINEURAL at 21:16

## 2023-01-24 RX ADMIN — EPHEDRINE SULFATE 10 MG: 50 INJECTION INTRAVENOUS at 23:21

## 2023-01-24 RX ADMIN — LIDOCAINE HYDROCHLORIDE,EPINEPHRINE BITARTRATE 3 ML: 15; .005 INJECTION, SOLUTION EPIDURAL; INFILTRATION; INTRACAUDAL; PERINEURAL at 21:11

## 2023-01-24 RX ADMIN — ROPIVACAINE HYDROCHLORIDE 8 ML/HR: 2 INJECTION, SOLUTION EPIDURAL; INFILTRATION at 21:20

## 2023-01-24 RX ADMIN — Medication 1 MILLI-UNITS/MIN: at 16:18

## 2023-01-24 NOTE — H&P
OBSTETRICAL HISTORY [facility]    Date: 2023       Time: 2:42 PM   Patient Name: Carmine Carr     Patient : 2000  Room/Bed: [place] [address]    Admission Date/Time: 2023  1:46 PM      CC: Decreased Fetal Movement     HPI: Carmine Carr is a 20 y.o.  at 38w5d who presents from her primary OB office with complaints of decreased fetal movement. She states since this morning she has not been feeling baby move as much as she usually does and this afternoon she felt no movement at all. The patient reports fetal movement is absent, denies contractions, denies loss of fluid, denies vaginal bleeding. Patient denies headache, vision changes, nausea, vomiting, fever, chills, shortness of breath, chest pain, RUQ pain, abdominal pain, diarrhea, change in color/amount/odor of vaginal discharge, dysuria or, hematuria. DATING:  LMP: No LMP recorded (lmp unknown). Patient is pregnant.   Estimated Date of Delivery: 23   Based on: early ultrasound, at 6 1/7 weeks GA    PREGNANCY RISK FACTORS:  Patient Active Problem List   Diagnosis    Chlamydial infection    Gonorrhea    THC use    History of miscarriage    Family history of diabetes mellitus    Abnormal GTT (glucose tolerance test)    Rh negative state in antepartum period    Cramping affecting pregnancy, antepartum    38 weeks gestation of pregnancy    High-risk pregnancy, unspecified trimester        Steroids Given In This Pregnancy:  no     REVIEW OF SYSTEMS:   Constitutional: negative fever, negative chills, negative weight changes   HEENT: negative visual disturbances, negative headaches, negative dizziness, negative hearing loss  Breast: Negative breast abnormalities, negative breast lumps, negative nipple discharge  Respiratory: negative dyspnea, negative cough, negative SOB  Cardiovascular: negative chest pain,  negative palpitations, negative arrhythmia, negative syncope   Gastrointestinal: negative abdominal pain, negative RUQ pain, negative N/V, negative diarrhea, negative constipation, negative bowel changes, negative heartburn   Genitourinary: negative dysuria, negative hematuria, negative urinary incontinence, negative vaginal discharge, negative vaginal bleeding or spotting  Dermatological: negative rash, negative pruritis, negative mole or other skin changes  Hematologic: negative bruising  Immunologic/Lymphatic: negative recent illness, negative recent sick contact  Musculoskeletal: negative back pain, negative myalgias, negative arthralgias  Neurological:  negative dizziness, negative migraines, negative seizures, negative weakness  Behavior/Psych: negative depression, negative anxiety, negative SI, negative HI    OBSTETRIC HISTORY:   OB History    Para Term  AB Living   2 0 0 0 1 0   SAB IAB Ectopic Molar Multiple Live Births   1 0 0 0 0 0      # Outcome Date GA Lbr Zbigniew/2nd Weight Sex Delivery Anes PTL Lv   2 Current            1 SAB 2021               PAST MEDICAL HISTORY:   has a past medical history of Anxiety, Depression, and Depression. PAST SURGICAL HISTORY:   has a past surgical history that includes Tonsillectomy and Tympanostomy tube placement. ALLERGIES:  has No Known Allergies. MEDICATIONS:  Prior to Admission medications    Medication Sig Start Date End Date Taking?  Authorizing Provider   ondansetron (ZOFRAN-ODT) 4 MG disintegrating tablet Take 1 tablet by mouth every 8 hours as needed for Nausea or Vomiting 23  MONO Griffith CNP   pantoprazole (PROTONIX) 20 MG tablet Take 1 tablet by mouth every morning (before breakfast) 23   MONO Griffith CNP   Prenatal Vit-Fe Fumarate-FA (PNV PRENATAL PLUS MULTIVITAMIN) 27-1 MG TABS Take 1 tablet by mouth daily 22  MONO Calvo NP       FAMILY HISTORY:  family history includes Diabetes in her maternal grandfather, maternal grandmother, and mother; Heart Disease in her maternal grandfather; Hypertension in her maternal grandfather and maternal grandmother. SOCIAL HISTORY:   reports that she has never smoked. She has never used smokeless tobacco. She reports that she does not currently use alcohol. She reports that she does not currently use drugs after having used the following drugs: Marijuana Charmayne Stawilfredo).     VITALS:  Vitals:    01/24/23 1355   BP: 115/65   Pulse: 97   Resp: 18   Temp: 98 °F (36.7 °C)   TempSrc: Oral   SpO2: 100%         PHYSICAL EXAM:  Fetal Heart Monitor:  Baseline Heart Rate 130, moderate variability, present accelerations, absent decelerations  [place]: contractions, none    General appearance:  no apparent distress, alert and cooperative  HEENT: head atraumatic, normocephalic, moist mucous membranes, trachea midline  Neurologic:  alert, oriented, normal speech, no focal findings or movement disorder noted  Lungs:  No increased work of breathing, good air exchange, clear to auscultation bilaterally, no crackles or wheezing  Heart:  regular rate and rhythm and no murmur, rubs, gallops  Abdomen:  soft, gravid, non-tender, no rebound, guarding, or rigidity, no RUQ or epigastric tenderness, no signs or symptoms of abruption, no signs or symptoms of chorioamnionitis  Extremities:  no calf tenderness, non edematous, no varicosities, full range of motion in all four extremities   Musculoskeletal: Gross strength equal and intact throughout, no gross abnormalities, range of motion normal in hips, knees, shoulders and spine, CVA tenderness: none  Psychiatric: Mood appropriate, normal affect   Rectal Exam: not indicated  Pelvic Exam:   Chaperone for Intimate Exam: Chaperone was present for entire exam, Chaperone Name: iVvien Hernández RN  Sterile Vaginal Exam:  Cervix: No cervical motion tenderness   Uterus: Is gravid, Normal size, shape, consistency and non-tender   Adnexa: Non-tender, no palpable masses  Cervix: 2 cm dilated, 60 % effaced, -1 station, anterior position (out of 3 station), soft consistency, FETAL POSITION: Cephalic (confirmed by ultrasound), Membranes intact,    Bishops Score: 7     0 1 2 3   Position Posterior Intermediate Anterior -   Consistency Firm Intermediate Soft -   Effacement 0-30% 31-50% 51-80% >80%   Dilation 0cm 1-2cm 3-4cm >5cm   Fetal Station -3 -2 -1, 0 +1, +2         LIMITED BEDSIDE US:  Position: Cephalic  Placental Location: anterior  Fetal Heart Tones: Present  Fetal Movement: Present   Amniotic Fluid Index/Volume:  adequate 2x2 cm fluid pocket  Estimated Fetal Weight:  7 lbs 0 oz    PRENATAL LAB RESULTS:   Blood Type/Rh: A neg  Antibody Screen: negative  Hemoglobin, Hematocrit, Platelets: 17.7/36.7/059  Rubella: immune  T.  Pallidum, IgG: non-reactive  Hepatitis B Surface Antigen: non-reactive   Hepatitis C Antibody: unknown   HIV: non-reactive   Gonorrhea: negative  Chlamydia: negative  Urine culture: negative, date: 11/10/22    Early 1 hour Glucose Tolerance Test:  134  Early 3 hour Glucose Tolerance Test: Fasting 88/ 1hr 127/ 2 hr 94/ 3 hr 53  A1C:  5.0  3 hour Glucose Tolerance Test: Not Done    Group B Strep: negative on 23  Cystic Fibrosis Screen: negative  Sickle Cell Screen: not done  First Trimester Screen: low risk for aneuploidy  MSAFP: negative  Non-Invasive Prenatal Testing: not done  Anatomy US: Anterior placenta, 3VC, female gender, normal anatomy    Biophysical Profile:   Amniotic Fluid Index: 1.3  Tone: Present  Movement: Present  Breathing: Present    Biophysical Score: 6 / 8    Fetal Position: Cephalic    ASSESSMENT & PLAN:  Maida Bonner is a 20 y.o. female  at 38w5d IUP   - GBS negative / Rh negative / R immune   - No indication for GBS prophylaxis   - VSS, afebrile    - Rhogam W/u PP     Decreased Fetal Movement    - VSS   - Cat 1 FHT, no contractions on TOCO   - BPP  ( off for fluid)    - Patient has an anterior placenta on BSUS, and explained to patient this may decrease movement feeling   - Patient noted to have Oligohydramnios on BPP today, will keep for induction 2/2 Oligohydramnios     Oligohydramnios (new dx)    - BPP 6/8 off for fluid on admission today    - ATSO Dr. Micah Cooper   - CBC, TPall, T&S   - UDS R/B/A discussed, consent obtained and in chart   - Cat 1 FHT, TOCO q None min   - SVE: 2/60/-1 (out of 3 station)   - BSUS: Cephalic, Anterior placenta   - Mode of induction: Farrell Balloon     Hx SAB x 1     Fhx DM    - Elevated early 1 hr    - Early 3 hr GTT wnl    - No 3 hr    Hx Chlamydia/Gonorrhea    - Negative in pregnancy     THC Use    - UDS pending on admission     BMI 30      Patient Active Problem List    Diagnosis Date Noted    Rh negative state in antepartum period 08/05/2022     Priority: High     Needs rhogam at 28 weeks      Abnormal GTT (glucose tolerance test) 08/04/2022     Priority: High     3 hour and a1c ordered      Family history of diabetes mellitus 08/03/2022     Priority: High     Early 1 hour GTT ordered      History of miscarriage 06/16/2022     Priority: High    38 weeks gestation of pregnancy 01/24/2023     Priority: Medium    High-risk pregnancy, unspecified trimester 01/24/2023     Priority: Medium    Cramping affecting pregnancy, antepartum 10/23/2022     Priority: Medium    THC use 06/16/2022     Priority: Medium    Chlamydial infection 08/24/2021     Priority: Medium    Gonorrhea 08/24/2021     Priority: Medium       Plan discussed with Dr. Micah Cooper, who is agreeable. Steroids given this admission: No    Risks, benefits, alternatives and possible complications have been discussed in detail with the patient. Admission, and post admission procedures and expectations were discussed in detail. All questions were answered.     Attending's Name: Dr. Jethro Motley,   Ob/Gyn Resident  1/24/2023, 2:42 PM

## 2023-01-24 NOTE — CARE COORDINATION
ANTEPARTUM NOTE    38 weeks gestation of pregnancy [Z3A.38]  High-risk pregnancy, unspecified trimester [O09.90]    Emily was admitted to L&D on 1/24/2023 for IOL due to Oligohydramnios @ 38 5/7    OB GYN Provider: Umberto    Will meet with patient after delivery to verify name/address/phone/insurance and discuss discharge planning.     Anticipate DC home 2 nights after vaginal delivery or 4 nights after C/S delivery as long as hemodynamically stable.

## 2023-01-24 NOTE — FLOWSHEET NOTE
Patient to triage 2 with c/o decreased fetal movement since around 8 a.m. this morning. No bleeding, LOF, or CTX. EFM applied with FHTs of 143.  Dr. Wendy Krishnamurthy notified of patients arrival.

## 2023-01-24 NOTE — PROGRESS NOTES
Emily Eaton is a 22 y.o. female 38w5d        OB History    Para Term  AB Living   2       1 0   SAB IAB Ectopic Molar Multiple Live Births   1                # Outcome Date GA Lbr Zbigniew/2nd Weight Sex Delivery Anes PTL Lv   2 Current            1 SAB 2021               Vitals  BP: 106/80  Weight: 169 lb (76.7 kg)  Heart Rate: 90  Patient Position: Sitting  Albumin: Negative  Glucose: Negative      The patient was seen and evaluated. There was positive fetal movements. No contractions or leakage of fluid. Signs and symptoms of labor were reviewed.  The S/S of Pre-Eclampsia were reviewed with the patient in detail. She is to report any of these if they occur. She currently denies any of these.    Patient reports decreased fetal movement since this morning.  Reports still feeling baby move but not as active as the baby normally is.  Denies contractions, vaginal bleeding or leaking of fluid.    The patient was instructed on fetal kick counts and was given a kick sheet to complete every 8 hours. She was instructed that the baby should move at a minimum of ten times within one hour after a meal. The patient was instructed to lay down on her left side twenty minutes after eating and count movements for up to one hour with a target value of ten movements.  She was instructed to notify the office if she did not make that target after two attempts or if after any attempt there was less than four movements.    The patient reports that the targets have been made Yes.     10/11/22 Tdap @ 27 week gest   10/11/22 Pt declined Flu Vacc  22 PRAF form completed       T-Dap Vaccine Completed (27-36 weeks): No    Allergies:  Allergies as of 2023   • (No Known Allergies)         Group Beta Strep collection was completed. Yes  GBS Results:   Hospital Outpatient Visit on 2023   Component Date Value Ref Range Status   • Specimen Description 2023 .VAGINA   Final   • Culture 2023  NEGATIVE FOR GROUP B STREPTOCOCCI   Final   ]        Cervical Exam was:   1 cm dilated, 70 effaced, -1 station    The literature regarding a questionable link to pitocin augmentation and induction of labor, the assistance of labor contractions and the initiation of contractions to help delivery, have been reviewed with the patient regarding the increased potential of having a  with Attention Deficit Hyperactivity Disorder and or Autism. These two disorders and the ramifications of their impact on a child and the family caring for that child has been reviewed with the patient in detail. She was given the risks, benefits and alternatives of the use of this medication. She has agreed to its use in the delivery of her unborn child if needed at the time of delivery, Yes. The patient was counseled on the mandatory call ahead policy. She has been instructed to call the office at anytime prior to going into the hospital so the on-call physician may direct her to the appropriate facility for care. Exceptions were reviewed including but not limited to: Decreased fetal movement, vaginal Bleeding or hemorrhage, trauma, readily expectant delivery, or any instance where she feels 911 should be utilized. The patient was counseled on Labor & Delivery. yes  Route of delivery and counseling on vaginal, operative vaginal, and  sections were completed with the risks of each to both the patient as well as her baby. The possibility of a blood transfusion was discussed as well. The patient was not opposed to receiving a transfusion if needed. The patient was counseled on types of analgesia during labor and is considering either Regional or IV medication the risks, benefits and alternatives were discussed.  Testing:  Not indicated        Assessment:  1. Lynn Alas is a [address] [facility] y.o. female  2.    3. 38w5d    Patient Active Problem List    Diagnosis Date Noted    Rh negative state in antepartum period 08/05/2022     Priority: High     Overview Note:     Needs rhogam at 28 weeks     • Abnormal GTT (glucose tolerance test) 08/04/2022     Priority: High     Overview Note:     3 hour and a1c ordered     • Family history of diabetes mellitus 08/03/2022     Priority: High     Overview Note:     Early 1 hour GTT ordered     • History of miscarriage 06/16/2022     Priority: High   • Cramping affecting pregnancy, antepartum 10/23/2022     Priority: Medium   • THC use 06/16/2022     Priority: Medium   • Chlamydial infection 08/24/2021     Priority: Medium   • Gonorrhea 08/24/2021     Priority: Medium        Diagnosis Orders   1. 38 weeks gestation of pregnancy        2. Abnormal GTT (glucose tolerance test)        3. Family history of diabetes mellitus        4. History of miscarriage        5. Rh negative state in antepartum period                Plan:  The patient will return to the office for her next visit in 1 weeks. See antepartum flow sheet.   To L&D today for evaluation of decreased fetal movement.  Scheduled elective induction 1/26/2023 at [facility]      Patient was seen with total face to face time of 20 minutes. More than 50% of this visit was on counseling and education regarding her    Diagnosis Orders   1. 38 weeks gestation of pregnancy        2. Abnormal GTT (glucose tolerance test)        3. Family history of diabetes mellitus        4. History of miscarriage        5. Rh negative state in antepartum period         and her options. She was also counseled on her preventative health maintenance recommendations and follow-up.

## 2023-01-24 NOTE — FLOWSHEET NOTE
Dr. Mtz at bedside, nguyen balloon inserted with 60 ml normal saline instilled. Pt tolerated well. Gentle traction placed and taped to patients leg.

## 2023-01-24 NOTE — DISCHARGE SUMMARY
Obstetric Discharge Summary  [facility]    Patient Name: Kim Morrell  Patient : 2000  Primary Care Physician: No primary care provider on file. Admit Date: 2023    Principal Diagnosis: IUP at 38w5d, admitted for IOL 2/2 Oligohydramnios (new dx)      Her pregnancy has been complicated by:   Patient Active Problem List   Diagnosis    Chlamydial infection    Gonorrhea    THC use    Hx SAB x 1    FHx DM    Abnormal GTT (glucose tolerance test)    38 weeks gestation of pregnancy    PLTCS F 23 Apg 8/9 Wt 6#6    S/P  section    Encounter for postpartum visit    Encounter for routine postpartum follow-up       Infection Present?: NO  Hospital Acquired: N/A    Surgical Operations & Procedures:  Analgesia: epidural  Delivery Type:  Delivery: See Labor and Delivery Summary   Laceration(s): Absent    Consultations: NICU, and Anesthesia    Pertinent Findings & Procedures:   Kim Morrell is a 20 y.o. female  at 44w7d admitted for induction of labor 2/2 Oligohydramnios; received pitocin, nguyen balloon, morphine/Compazine x 1, IUPC, FSE. Patient had prolonged deceleration with vitaliy to the 90's lasting 9 minutes despite resuscitation methods. Mitcheal Calk was called. Upon entry into the OR fetal heart tones were wnl. Heart tones were monitored continuously while epidural was re dosed and abdomen was prepped. She delivered by PLTCS a Live Born infant on 23. Information for the patient's :  David Jara Girl Lauren Pfeiffero [[id number]]   female   Birth Weight: 6 lb 6.3 oz (2.9 kg)     Apgars: 8 at 1 minute and 9 at 5 minutes.      Postpartum course: Patient stable     POD #1: Hg 10.4     Course of patient: uncomplicated    Discharge to: Home    Readmission planned: no     Recommendations on Discharge:     Medications:      Medication List        START taking these medications      acetaminophen 500 MG tablet  Commonly known as: TYLENOL  Take 2 tablets by mouth 3 times daily     docusate sodium 100 MG capsule  Commonly known as: COLACE  Take 1 capsule by mouth 2 times daily     ibuprofen 800 MG tablet  Commonly known as: ADVIL;MOTRIN  Take 1 tablet by mouth every 8 hours as needed for Pain or Fever     oxyCODONE 5 MG immediate release tablet  Commonly known as: Roxicodone  Take 1 tablet by mouth every 6 hours as needed for Pain for up to 20 doses. Intended supply: 3 days. Take lowest dose possible to manage pain Max Daily Amount: 20 mg            CONTINUE taking these medications      ondansetron 4 MG disintegrating tablet  Commonly known as: ZOFRAN-ODT  Take 1 tablet by mouth every 8 hours as needed for Nausea or Vomiting     pantoprazole 20 MG tablet  Commonly known as: PROTONIX  Take 1 tablet by mouth every morning (before breakfast)     PNV Prenatal Plus Multivitamin 27-1 MG Tabs  Take 1 tablet by mouth daily               Where to Get Your Medications        These medications were sent to [address]  [place]  [address]      Phone: [phone]   acetaminophen 500 MG tablet  docusate sodium 100 MG capsule  ibuprofen 800 MG tablet  oxyCODONE 5 MG immediate release tablet           Activity: pelvic rest x 6 weeks, no driving on narcotics, no lifting greater than 15 lbs  Diet: cardiac diet  Follow up: 1 week for Silver dressing removal    Condition on discharge: stable    Discharge date: 1/26/23    Jenny Herrera DO  Ob/Gyn Resident    Comments:  Home care and follow-up care were reviewed. Pelvic rest, and birth control were reviewed. Signs and symptoms of mastitis and post partum depression were reviewed. The patient is to notify her physician if any of these occur. The patient was counseled on secondary smoke risks and the increased risk of sudden infant death syndrome and respiratory problems to her baby with exposure.  She was counseled on various alternate recommendations to decrease the exposure to secondary smoke to her children.

## 2023-01-25 PROCEDURE — 3700000001 HC ADD 15 MINUTES (ANESTHESIA): Performed by: OBSTETRICS & GYNECOLOGY

## 2023-01-25 PROCEDURE — 6360000002 HC RX W HCPCS: Performed by: STUDENT IN AN ORGANIZED HEALTH CARE EDUCATION/TRAINING PROGRAM

## 2023-01-25 PROCEDURE — 59514 CESAREAN DELIVERY ONLY: CPT | Performed by: OBSTETRICS & GYNECOLOGY

## 2023-01-25 PROCEDURE — 2580000003 HC RX 258: Performed by: NURSE ANESTHETIST, CERTIFIED REGISTERED

## 2023-01-25 PROCEDURE — 2580000003 HC RX 258: Performed by: STUDENT IN AN ORGANIZED HEALTH CARE EDUCATION/TRAINING PROGRAM

## 2023-01-25 PROCEDURE — 6360000002 HC RX W HCPCS: Performed by: NURSE ANESTHETIST, CERTIFIED REGISTERED

## 2023-01-25 PROCEDURE — 7100000001 HC PACU RECOVERY - ADDTL 15 MIN: Performed by: OBSTETRICS & GYNECOLOGY

## 2023-01-25 PROCEDURE — 2500000003 HC RX 250 WO HCPCS: Performed by: NURSE ANESTHETIST, CERTIFIED REGISTERED

## 2023-01-25 PROCEDURE — 6370000000 HC RX 637 (ALT 250 FOR IP): Performed by: NURSE ANESTHETIST, CERTIFIED REGISTERED

## 2023-01-25 PROCEDURE — 7100000000 HC PACU RECOVERY - FIRST 15 MIN: Performed by: OBSTETRICS & GYNECOLOGY

## 2023-01-25 PROCEDURE — 6370000000 HC RX 637 (ALT 250 FOR IP): Performed by: STUDENT IN AN ORGANIZED HEALTH CARE EDUCATION/TRAINING PROGRAM

## 2023-01-25 PROCEDURE — 88307 TISSUE EXAM BY PATHOLOGIST: CPT

## 2023-01-25 PROCEDURE — 3700000000 HC ANESTHESIA ATTENDED CARE: Performed by: OBSTETRICS & GYNECOLOGY

## 2023-01-25 PROCEDURE — 2709999900 HC NON-CHARGEABLE SUPPLY: Performed by: OBSTETRICS & GYNECOLOGY

## 2023-01-25 PROCEDURE — 3609079900 HC CESAREAN SECTION: Performed by: OBSTETRICS & GYNECOLOGY

## 2023-01-25 PROCEDURE — 51701 INSERT BLADDER CATHETER: CPT

## 2023-01-25 PROCEDURE — 1220000000 HC SEMI PRIVATE OB R&B

## 2023-01-25 RX ORDER — MORPHINE SULFATE 1 MG/ML
INJECTION, SOLUTION EPIDURAL; INTRATHECAL; INTRAVENOUS PRN
Status: DISCONTINUED | OUTPATIENT
Start: 2023-01-25 | End: 2023-01-25 | Stop reason: SDUPTHER

## 2023-01-25 RX ORDER — DEXAMETHASONE SODIUM PHOSPHATE 10 MG/ML
INJECTION, SOLUTION INTRAMUSCULAR; INTRAVENOUS PRN
Status: DISCONTINUED | OUTPATIENT
Start: 2023-01-25 | End: 2023-01-25 | Stop reason: SDUPTHER

## 2023-01-25 RX ORDER — ACETAMINOPHEN 500 MG
1000 TABLET ORAL EVERY 6 HOURS SCHEDULED
Status: DISCONTINUED | OUTPATIENT
Start: 2023-01-25 | End: 2023-01-26 | Stop reason: HOSPADM

## 2023-01-25 RX ORDER — DIPHENHYDRAMINE HYDROCHLORIDE 50 MG/ML
INJECTION INTRAMUSCULAR; INTRAVENOUS PRN
Status: DISCONTINUED | OUTPATIENT
Start: 2023-01-25 | End: 2023-01-25 | Stop reason: SDUPTHER

## 2023-01-25 RX ORDER — BISACODYL 10 MG
10 SUPPOSITORY, RECTAL RECTAL DAILY PRN
Status: DISCONTINUED | OUTPATIENT
Start: 2023-01-25 | End: 2023-01-26 | Stop reason: HOSPADM

## 2023-01-25 RX ORDER — OXYCODONE HYDROCHLORIDE 5 MG/1
5 TABLET ORAL EVERY 4 HOURS PRN
Status: DISCONTINUED | OUTPATIENT
Start: 2023-01-25 | End: 2023-01-26 | Stop reason: HOSPADM

## 2023-01-25 RX ORDER — KETOROLAC TROMETHAMINE 30 MG/ML
30 INJECTION, SOLUTION INTRAMUSCULAR; INTRAVENOUS EVERY 6 HOURS
Status: DISPENSED | OUTPATIENT
Start: 2023-01-25 | End: 2023-01-25

## 2023-01-25 RX ORDER — VITAMIN A, ASCORBIC ACID, CHOLECALCIFEROL, .ALPHA.-TOCOPHEROL ACETATE, DL-, THIAMINE MONONITRATE, RIBOFLAVIN, NIACINAMIDE, PYRIDOXINE HYDROCHLORIDE, FOLIC ACID, CYANOCOBALAMIN, CALCIUM CARBONATE, IRON, ZINC OXIDE, AND CUPRIC OXIDE 4000; 120; 400; 22; 1.84; 3; 20; 10; 1; 12; 200; 29; 25; 2 [IU]/1; MG/1; [IU]/1; [IU]/1; MG/1; MG/1; MG/1; MG/1; MG/1; UG/1; MG/1; MG/1; MG/1; MG/1
1 TABLET ORAL DAILY
Status: DISCONTINUED | OUTPATIENT
Start: 2023-01-25 | End: 2023-01-26 | Stop reason: HOSPADM

## 2023-01-25 RX ORDER — DOCUSATE SODIUM 100 MG/1
100 CAPSULE, LIQUID FILLED ORAL 2 TIMES DAILY
Status: DISCONTINUED | OUTPATIENT
Start: 2023-01-25 | End: 2023-01-26 | Stop reason: HOSPADM

## 2023-01-25 RX ORDER — KETOROLAC TROMETHAMINE 30 MG/ML
INJECTION, SOLUTION INTRAMUSCULAR; INTRAVENOUS PRN
Status: DISCONTINUED | OUTPATIENT
Start: 2023-01-25 | End: 2023-01-25 | Stop reason: SDUPTHER

## 2023-01-25 RX ORDER — SODIUM CHLORIDE, SODIUM LACTATE, POTASSIUM CHLORIDE, CALCIUM CHLORIDE 600; 310; 30; 20 MG/100ML; MG/100ML; MG/100ML; MG/100ML
INJECTION, SOLUTION INTRAVENOUS CONTINUOUS PRN
Status: DISCONTINUED | OUTPATIENT
Start: 2023-01-25 | End: 2023-01-25 | Stop reason: SDUPTHER

## 2023-01-25 RX ORDER — SODIUM CHLORIDE, SODIUM LACTATE, POTASSIUM CHLORIDE, CALCIUM CHLORIDE 600; 310; 30; 20 MG/100ML; MG/100ML; MG/100ML; MG/100ML
INJECTION, SOLUTION INTRAVENOUS CONTINUOUS
Status: DISCONTINUED | OUTPATIENT
Start: 2023-01-25 | End: 2023-01-26 | Stop reason: HOSPADM

## 2023-01-25 RX ORDER — OXYCODONE HYDROCHLORIDE 5 MG/1
10 TABLET ORAL EVERY 4 HOURS PRN
Status: DISCONTINUED | OUTPATIENT
Start: 2023-01-25 | End: 2023-01-26 | Stop reason: HOSPADM

## 2023-01-25 RX ORDER — IBUPROFEN 800 MG/1
800 TABLET ORAL EVERY 8 HOURS PRN
Qty: 60 TABLET | Refills: 0 | Status: SHIPPED | OUTPATIENT
Start: 2023-01-25

## 2023-01-25 RX ORDER — OXYCODONE HYDROCHLORIDE 5 MG/1
5 TABLET ORAL EVERY 6 HOURS PRN
Qty: 20 TABLET | Refills: 0 | Status: SHIPPED | OUTPATIENT
Start: 2023-01-25 | End: 2023-02-01

## 2023-01-25 RX ORDER — ONDANSETRON 2 MG/ML
4 INJECTION INTRAMUSCULAR; INTRAVENOUS EVERY 6 HOURS PRN
Status: DISCONTINUED | OUTPATIENT
Start: 2023-01-25 | End: 2023-01-26 | Stop reason: HOSPADM

## 2023-01-25 RX ORDER — SODIUM CHLORIDE 0.9 % (FLUSH) 0.9 %
5-40 SYRINGE (ML) INJECTION EVERY 12 HOURS SCHEDULED
Status: DISCONTINUED | OUTPATIENT
Start: 2023-01-25 | End: 2023-01-26 | Stop reason: HOSPADM

## 2023-01-25 RX ORDER — POLYETHYLENE GLYCOL 3350 17 G/17G
17 POWDER, FOR SOLUTION ORAL DAILY
Status: DISCONTINUED | OUTPATIENT
Start: 2023-01-25 | End: 2023-01-26 | Stop reason: HOSPADM

## 2023-01-25 RX ORDER — SODIUM CHLORIDE 0.9 % (FLUSH) 0.9 %
5-40 SYRINGE (ML) INJECTION PRN
Status: DISCONTINUED | OUTPATIENT
Start: 2023-01-25 | End: 2023-01-26 | Stop reason: HOSPADM

## 2023-01-25 RX ORDER — CHLOROPROCAINE HYDROCHLORIDE 30 MG/ML
INJECTION, SOLUTION EPIDURAL; INFILTRATION; INTRACAUDAL; PERINEURAL PRN
Status: DISCONTINUED | OUTPATIENT
Start: 2023-01-25 | End: 2023-01-25 | Stop reason: SDUPTHER

## 2023-01-25 RX ORDER — DIPHENHYDRAMINE HYDROCHLORIDE 50 MG/ML
25 INJECTION INTRAMUSCULAR; INTRAVENOUS EVERY 6 HOURS PRN
Status: DISCONTINUED | OUTPATIENT
Start: 2023-01-25 | End: 2023-01-26 | Stop reason: HOSPADM

## 2023-01-25 RX ORDER — IBUPROFEN 800 MG/1
800 TABLET ORAL EVERY 8 HOURS PRN
Status: DISCONTINUED | OUTPATIENT
Start: 2023-01-25 | End: 2023-01-26 | Stop reason: HOSPADM

## 2023-01-25 RX ORDER — DOCUSATE SODIUM 100 MG/1
100 CAPSULE, LIQUID FILLED ORAL 2 TIMES DAILY
Qty: 60 CAPSULE | Refills: 0 | Status: SHIPPED | OUTPATIENT
Start: 2023-01-25 | End: 2023-02-24

## 2023-01-25 RX ORDER — NALOXONE HYDROCHLORIDE 0.4 MG/ML
0.4 INJECTION, SOLUTION INTRAMUSCULAR; INTRAVENOUS; SUBCUTANEOUS PRN
Status: DISCONTINUED | OUTPATIENT
Start: 2023-01-25 | End: 2023-01-26 | Stop reason: HOSPADM

## 2023-01-25 RX ORDER — CEFAZOLIN SODIUM 1 G/3ML
INJECTION, POWDER, FOR SOLUTION INTRAMUSCULAR; INTRAVENOUS PRN
Status: DISCONTINUED | OUTPATIENT
Start: 2023-01-25 | End: 2023-01-25 | Stop reason: SDUPTHER

## 2023-01-25 RX ORDER — SODIUM CHLORIDE 9 MG/ML
INJECTION, SOLUTION INTRAVENOUS PRN
Status: DISCONTINUED | OUTPATIENT
Start: 2023-01-25 | End: 2023-01-26 | Stop reason: HOSPADM

## 2023-01-25 RX ORDER — ONDANSETRON 2 MG/ML
INJECTION INTRAMUSCULAR; INTRAVENOUS PRN
Status: DISCONTINUED | OUTPATIENT
Start: 2023-01-25 | End: 2023-01-25 | Stop reason: SDUPTHER

## 2023-01-25 RX ORDER — LANOLIN 72 %
OINTMENT (GRAM) TOPICAL
Status: DISCONTINUED | OUTPATIENT
Start: 2023-01-25 | End: 2023-01-26 | Stop reason: HOSPADM

## 2023-01-25 RX ORDER — SIMETHICONE 80 MG
80 TABLET,CHEWABLE ORAL EVERY 6 HOURS PRN
Status: DISCONTINUED | OUTPATIENT
Start: 2023-01-25 | End: 2023-01-26 | Stop reason: HOSPADM

## 2023-01-25 RX ORDER — LIDOCAINE HYDROCHLORIDE 20 MG/ML
INJECTION, SOLUTION EPIDURAL; INFILTRATION; INTRACAUDAL; PERINEURAL PRN
Status: DISCONTINUED | OUTPATIENT
Start: 2023-01-25 | End: 2023-01-25 | Stop reason: SDUPTHER

## 2023-01-25 RX ORDER — ACETAMINOPHEN 500 MG
1000 TABLET ORAL 3 TIMES DAILY
Qty: 540 TABLET | Refills: 1 | Status: SHIPPED | OUTPATIENT
Start: 2023-01-25

## 2023-01-25 RX ADMIN — ACETAMINOPHEN 1000 MG: 500 TABLET ORAL at 11:55

## 2023-01-25 RX ADMIN — LIDOCAINE HYDROCHLORIDE 10 ML: 20 INJECTION, SOLUTION EPIDURAL; INFILTRATION; INTRACAUDAL; PERINEURAL at 03:44

## 2023-01-25 RX ADMIN — PHENYLEPHRINE HYDROCHLORIDE 100 MCG: 10 INJECTION INTRAVENOUS at 04:05

## 2023-01-25 RX ADMIN — DOCUSATE SODIUM 100 MG: 100 CAPSULE ORAL at 10:10

## 2023-01-25 RX ADMIN — LIDOCAINE HYDROCHLORIDE 10 ML: 20 INJECTION, SOLUTION EPIDURAL; INFILTRATION; INTRACAUDAL; PERINEURAL at 04:33

## 2023-01-25 RX ADMIN — Medication 1 TABLET: at 10:10

## 2023-01-25 RX ADMIN — SODIUM CHLORIDE, POTASSIUM CHLORIDE, SODIUM LACTATE AND CALCIUM CHLORIDE: 600; 310; 30; 20 INJECTION, SOLUTION INTRAVENOUS at 03:41

## 2023-01-25 RX ADMIN — AZITHROMYCIN 500 MG: 100 POWDER, FOR SUSPENSION ORAL at 03:48

## 2023-01-25 RX ADMIN — PHENYLEPHRINE HYDROCHLORIDE 200 MCG: 10 INJECTION INTRAVENOUS at 04:27

## 2023-01-25 RX ADMIN — KETOROLAC TROMETHAMINE 30 MG: 30 INJECTION, SOLUTION INTRAMUSCULAR; INTRAVENOUS at 11:59

## 2023-01-25 RX ADMIN — PHENYLEPHRINE HYDROCHLORIDE 200 MCG: 10 INJECTION INTRAVENOUS at 04:30

## 2023-01-25 RX ADMIN — Medication 909 ML/HR: at 03:58

## 2023-01-25 RX ADMIN — CHLOROPROCAINE HYDROCHLORIDE 20 ML: 30 INJECTION, SOLUTION EPIDURAL; INFILTRATION; INTRACAUDAL; PERINEURAL at 03:37

## 2023-01-25 RX ADMIN — PHENYLEPHRINE HYDROCHLORIDE 100 MCG: 10 INJECTION INTRAVENOUS at 04:22

## 2023-01-25 RX ADMIN — PHENYLEPHRINE HYDROCHLORIDE 100 MCG: 10 INJECTION INTRAVENOUS at 04:18

## 2023-01-25 RX ADMIN — DOCUSATE SODIUM 100 MG: 100 CAPSULE ORAL at 20:16

## 2023-01-25 RX ADMIN — SODIUM CHLORIDE, POTASSIUM CHLORIDE, SODIUM LACTATE AND CALCIUM CHLORIDE: 600; 310; 30; 20 INJECTION, SOLUTION INTRAVENOUS at 05:08

## 2023-01-25 RX ADMIN — PHENYLEPHRINE HYDROCHLORIDE 100 MCG: 10 INJECTION INTRAVENOUS at 03:45

## 2023-01-25 RX ADMIN — SODIUM CHLORIDE, POTASSIUM CHLORIDE, SODIUM LACTATE AND CALCIUM CHLORIDE: 600; 310; 30; 20 INJECTION, SOLUTION INTRAVENOUS at 00:07

## 2023-01-25 RX ADMIN — ACETAMINOPHEN 1000 MG: 500 TABLET ORAL at 18:45

## 2023-01-25 RX ADMIN — PHENYLEPHRINE HYDROCHLORIDE 100 MCG: 10 INJECTION INTRAVENOUS at 04:12

## 2023-01-25 RX ADMIN — LIDOCAINE HYDROCHLORIDE 10 ML: 20 INJECTION, SOLUTION EPIDURAL; INFILTRATION; INTRACAUDAL; PERINEURAL at 03:46

## 2023-01-25 RX ADMIN — DEXAMETHASONE SODIUM PHOSPHATE 10 MG: 10 INJECTION INTRAMUSCULAR; INTRAVENOUS at 03:59

## 2023-01-25 RX ADMIN — KETOROLAC TROMETHAMINE 30 MG: 30 INJECTION, SOLUTION INTRAMUSCULAR; INTRAVENOUS at 04:37

## 2023-01-25 RX ADMIN — SODIUM CHLORIDE, POTASSIUM CHLORIDE, SODIUM LACTATE AND CALCIUM CHLORIDE: 600; 310; 30; 20 INJECTION, SOLUTION INTRAVENOUS at 12:05

## 2023-01-25 RX ADMIN — PHENYLEPHRINE HYDROCHLORIDE 100 MCG: 10 INJECTION INTRAVENOUS at 04:00

## 2023-01-25 RX ADMIN — PHENYLEPHRINE HYDROCHLORIDE 100 MCG: 10 INJECTION INTRAVENOUS at 04:14

## 2023-01-25 RX ADMIN — CEFAZOLIN 2 G: 1 INJECTION, POWDER, FOR SOLUTION INTRAMUSCULAR; INTRAVENOUS at 03:48

## 2023-01-25 RX ADMIN — PHENYLEPHRINE HYDROCHLORIDE 100 MCG: 10 INJECTION INTRAVENOUS at 04:01

## 2023-01-25 RX ADMIN — ONDANSETRON 4 MG: 2 INJECTION INTRAMUSCULAR; INTRAVENOUS at 03:59

## 2023-01-25 RX ADMIN — KETOROLAC TROMETHAMINE 30 MG: 30 INJECTION, SOLUTION INTRAMUSCULAR; INTRAVENOUS at 18:46

## 2023-01-25 RX ADMIN — MORPHINE SULFATE 3 MG: 1 INJECTION, SOLUTION EPIDURAL; INTRATHECAL; INTRAVENOUS at 04:48

## 2023-01-25 RX ADMIN — PHENYLEPHRINE HYDROCHLORIDE 100 MCG: 10 INJECTION INTRAVENOUS at 04:43

## 2023-01-25 RX ADMIN — DIPHENHYDRAMINE HYDROCHLORIDE 12.5 MG: 50 INJECTION, SOLUTION INTRAMUSCULAR; INTRAVENOUS at 03:59

## 2023-01-25 ASSESSMENT — PAIN SCALES - GENERAL
PAINLEVEL_OUTOF10: 4
PAINLEVEL_OUTOF10: 4
PAINLEVEL_OUTOF10: 0
PAINLEVEL_OUTOF10: 4
PAINLEVEL_OUTOF10: 4
PAINLEVEL_OUTOF10: 0

## 2023-01-25 ASSESSMENT — PAIN DESCRIPTION - DESCRIPTORS
DESCRIPTORS: CRAMPING
DESCRIPTORS: ACHING;DISCOMFORT

## 2023-01-25 ASSESSMENT — PAIN DESCRIPTION - LOCATION
LOCATION: ABDOMEN
LOCATION: ABDOMEN

## 2023-01-25 NOTE — PROGRESS NOTES
Ob/Gyn Progress Note    Was called to patient bedside by her nurse for prolonged fetal deceleration with heart tones in the 80's. Upon entering the room heart tones had been down for about 6 minutes. Multiple position changes were made with no fetal resuscitation. SVE was performed and cervix was unchanged at 5/70/-1. Fetal heart tones remained down for a total of 9 minutes. Verbal consent for  section was done and NGUYỄN was called. Upon entry into the OR fetal heart tones were back up to 133. Decision was made to proceed with  section but in a non emergent fashion. Fetal heart tones were monitored continuously while patient had her epidural re dosed and nguyen catheter placed. Fetal heart tones remained wnl and instrument count was performed. Patient was prepped and draped in normal fashion.      Deandre Rodriguez DO, PGY-3  Ob/Gyn Resident  [address]  23

## 2023-01-25 NOTE — PROGRESS NOTES
SPIRITUAL CARE DEPARTMENT - [address]  PROGRESS NOTE    Shift date: 1/25/2023  Shift day: Tuesday   Shift # 3    Room # OBR2/OBR2-01   Name: Sera Bernal                Nondenominational:    Place of [employer] orthodox:     Referral: OB ALERT    Admit Date & Time: 1/24/2023  1:46 PM    Assessment:  Sera Bernal is a 20 y.o. female in the hospital because of pregnancy. Intervention:       remained in L& D lobby . Greeted patient's mother and offered words of support. Patient's mother informed of chaplains availability for needed assistance. Outcome: Mother expressed gratitude for words of encouragement. Plan:  Chaplains will remain available to offer spiritual and emotional support as needed. Electronically signed by Rosa Isela Cavazos on 1/25/2023 at 4:26 AM.  Godwin Martel  [phone]   01/25/23 6337   Encounter Summary   Service Provided For: Patient; Family   Referral/Consult From: Nurse   Support System Parent   Last Encounter  01/25/23   Complexity of Encounter High   Begin Time 0345   End Time  0423   Total Time Calculated 38 min   Crisis   Type Emotional distress; Follow up   Spiritual/Emotional needs   Type Spiritual Support   Assessment/Intervention/Outcome   Assessment Anxious; Coping   Intervention Sustaining Presence/Ministry of presence;Nurtured Hope   Outcome Expressed Gratitude

## 2023-01-25 NOTE — PROGRESS NOTES
Labor Progress Note    Emily Mendoza is a 20 y.o. female  at 44w7d  The patient was seen and examined. Her pain is well controlled. She reports fetal movement is present, complains of contractions, complains of loss of fluid, denies vaginal bleeding. Patient had two - two minute decelerations with vitaliy to 90. Pitocin was shut off, IV fluid bolus given and FSE placed.       Vital Signs:  Vitals:    23 2135 23 2145 23 2200 23 2300   BP: (!) 108/57 118/69 117/76 (!) 90/50   Pulse: 100 (!) 113 93 69   Resp: 17 16     Temp:       TempSrc:       SpO2: 97% 96%           FHT: 140, moderate variability, accelerations present, decelerations prolonged  Contractions: regular, every 3-4 minutes    Chaperone for Intimate Exam: Chaperone was present for entire exam, Chaperone Name:   Cervical Exam: 5 cm dilated, 70 effaced, -1 station  Pitocin: @ 0 mu/min    Membranes: Ruptured clear fluid  Scalp Electrode in place: present  Intrauterine Pressure Catheter in Place: present    Interventions: Pitocin off, IV fluid bolus given, maternal repositioning    Assessment/Plan:  Deirdre Enriquez is a 20 y.o. female  at 44w7d admitted for IUP   - GBS negative, No indication for GBS prophylaxis   - VSS, afebrile    - cEFM/TOCO- Cat two   - IVF LR @ 125 ml/hr    IOL 2/2 Oligo   - Patient with prolong decel lasting two minutes and vitaliy to 90   - FSE placed and SVE 5/70/-1   - Heart tones back up to normal limits   - Pitocin shut off and fluid bolus given  - IUPC/FSE   - AROM clr, @ 1840   - Pit @ 1618   - Epidural   - S/p nguyen balloon   - S/p Morphine/Compazine x 1   - Continue to monitor closely    Attending updated and in agreement with plan    Génesis Hernández DO  Ob/Gyn Resident  2023, 11:21 PM

## 2023-01-25 NOTE — ANESTHESIA PRE PROCEDURE
Department of Anesthesiology  Preprocedure Note       Name:  Alia Membreno   Age:  20 y.o.  :  2000                                          MRN:  [id number]         Date:  2023      Surgeon: * No surgeons listed *    Procedure: * No procedures listed *    Medications prior to admission:   Prior to Admission medications    Medication Sig Start Date End Date Taking?  Authorizing Provider   ondansetron (ZOFRAN-ODT) 4 MG disintegrating tablet Take 1 tablet by mouth every 8 hours as needed for Nausea or Vomiting 23  MONO High CNP   pantoprazole (PROTONIX) 20 MG tablet Take 1 tablet by mouth every morning (before breakfast) 23   MONO High CNP   Prenatal Vit-Fe Fumarate-FA (PNV PRENATAL PLUS MULTIVITAMIN) 27-1 MG TABS Take 1 tablet by mouth daily 22  MONO Yousif NP       Current medications:    Current Facility-Administered Medications   Medication Dose Route Frequency Provider Last Rate Last Admin    acetaminophen (TYLENOL) tablet 1,000 mg  1,000 mg Oral Q6H Radha Marking, DO        ondansetron (ZOFRAN-ODT) disintegrating tablet 4 mg  4 mg Oral Q8H PRN Margert Eclectic, DO        Or    ondansetron (ZOFRAN) injection 4 mg  4 mg IntraVENous Q6H PRN Margert Eclectic, DO        lactated ringers IV soln infusion   IntraVENous Continuous Radha Marking,  mL/hr at 23 193 Rate Verify at 23    lactated ringers bolus  500 mL IntraVENous PRN Radha Marking, DO        Or    lactated ringers bolus  1,000 mL IntraVENous PRN Radha Marking, DO        sodium chloride flush 0.9 % injection 5-40 mL  5-40 mL IntraVENous 2 times per day Radha Marking, DO        sodium chloride flush 0.9 % injection 5-40 mL  5-40 mL IntraVENous PRN Radha Marking, DO        0.9 % sodium chloride infusion  25 mL IntraVENous PRN Radha Marking, DO        diphenhydrAMINE (BENADRYL) tablet 25 mg  25 mg Oral Q4H PRN Radha Marking, DO        oxytocin (PITOCIN) 30 units in 500 mL infusion  87.3 seth-units/min IntraVENous PRN Mozelle [place], DO        And    oxytocin (PITOCIN) 10 unit bolus from the bag  10 Units IntraVENous PRN Mozelle [place], DO        acetaminophen (TYLENOL) tablet 1,000 mg  1,000 mg Oral Q6H PRN Mozelle [place], DO        oxytocin (PITOCIN) 30 units in 500 mL infusion  1-20 seth-units/min IntraVENous Continuous Deirdrelorena Sanchez, DO 4 mL/hr at 01/24/23 1934 4 seth-units/min at 01/24/23 1934    ropivacaine (NAROPIN) 0.2% injection 0.2%                Allergies:  No Known Allergies    Problem List:    Patient Active Problem List   Diagnosis Code    Chlamydial infection A74.9    Gonorrhea A54.9    THC use F12.10    Hx SAB x 1 Z87.59    FHx DM Z83.3    Abnormal GTT (glucose tolerance test) R73.09    38 weeks gestation of pregnancy Z3A.38       Past Medical History:        Diagnosis Date    Anxiety     Depression     Depression        Past Surgical History:        Procedure Laterality Date    TONSILLECTOMY      TYMPANOSTOMY TUBE PLACEMENT         Social History:    Social History     Tobacco Use    Smoking status: Never    Smokeless tobacco: Never   Substance Use Topics    Alcohol use: Not Currently                                Counseling given: Not Answered      Vital Signs (Current):   Vitals:    01/24/23 1618 01/24/23 1704 01/24/23 1822 01/24/23 1900   BP: 116/79 129/78 127/78 117/64   Pulse: 87 74 76 87   Resp:    16   Temp:    36.8 °C (98.3 °F)   TempSrc:       SpO2:                                                  BP Readings from Last 3 Encounters:   01/24/23 117/64   01/24/23 106/80   01/17/23 112/72       NPO Status:                                                                                 BMI:   Wt Readings from Last 3 Encounters:   01/24/23 169 lb (76.7 kg)   01/17/23 177 lb (80.3 kg)   01/13/23 169 lb 15.6 oz (77.1 kg)     There is no height or weight on file to calculate BMI.    CBC:   Lab Results   Component Value Date/Time WBC 7.9 01/24/2023 03:33 PM    RBC 4.64 01/24/2023 03:33 PM    HGB 13.3 01/24/2023 03:33 PM    HCT 40.0 01/24/2023 03:33 PM    MCV 86.2 01/24/2023 03:33 PM    RDW 13.8 01/24/2023 03:33 PM     01/24/2023 03:33 PM       CMP:   Lab Results   Component Value Date/Time     07/06/2022 09:45 PM    K 3.5 07/06/2022 09:45 PM     07/06/2022 09:45 PM    CO2 22 07/06/2022 09:45 PM    BUN 7 07/06/2022 09:45 PM    CREATININE <0.40 07/06/2022 09:45 PM    GFRAA Can not be calculated 07/06/2022 09:45 PM    LABGLOM Can not be calculated 07/06/2022 09:45 PM    GLUCOSE 134 08/04/2022 12:44 PM    GLUCOSE 150 07/06/2022 09:45 PM    PROT 6.5 07/06/2022 09:45 PM    CALCIUM 9.0 07/06/2022 09:45 PM    BILITOT 0.20 07/06/2022 09:45 PM    ALKPHOS 65 07/06/2022 09:45 PM    AST 18 07/06/2022 09:45 PM    ALT 32 07/06/2022 09:45 PM       POC Tests: No results for input(s): POCGLU, POCNA, POCK, POCCL, POCBUN, POCHEMO, POCHCT in the last 72 hours.     Coags: No results found for: PROTIME, INR, APTT    HCG (If Applicable):   Lab Results   Component Value Date    HCGQUANT 70,323 (H) 06/16/2022        ABGs: No results found for: PHART, PO2ART, HQI3YZW, CKJ2HGI, BEART, P2TEJONF     Type & Screen (If Applicable):  No results found for: LABABO, LABRH    Drug/Infectious Status (If Applicable):  No results found for: HIV, HEPCAB    COVID-19 Screening (If Applicable):   Lab Results   Component Value Date/Time    COVID19 Not Detected 10/23/2022 03:19 PM           Anesthesia Evaluation  Patient summary reviewed and Nursing notes reviewed no history of anesthetic complications:   Airway: Mallampati: II  TM distance: >3 FB   Neck ROM: full  Mouth opening: > = 3 FB   Dental:          Pulmonary:                              Cardiovascular:          ECG reviewed  Rhythm: regular                   ROS comment: Narrative & Impression    Normal sinus rhythm  Normal ECG  When compared with ECG of 07-JUL-2016 02:49,  Nonspecific T wave abnormality, improved in Inferior leads         Neuro/Psych:   (+) psychiatric history:depression/anxiety             GI/Hepatic/Renal:             Endo/Other:                     Abdominal:             Vascular: Other Findings:           Anesthesia Plan      epidural     ASA 2     (Plts 229)        Anesthetic plan and risks discussed with patient. Use of blood products discussed with patient whom. Plan discussed with CRNA.     Attending anesthesiologist reviewed and agrees with Preprocedure content                Rosalina Rosario, APRN - CRNA   1/24/2023

## 2023-01-25 NOTE — CONSULTS
Mom reports baby just finished a 10 minute feed at breast.  Baby moved into STS, benefits discussed with mom. She states she was gifted a pump but is hoping to sign up with WIC. Signed medical necessity form given, encouraged to contact her insurance provider. Reviewed  feeding expectations and early cues. Education booklet given, call when baby next cues to feed.

## 2023-01-25 NOTE — FLOWSHEET NOTE
Rn at bedside. Pt having recurrent decelerations. Pt repositioned, pitocin turned off and bolus given. Vale RN at bedside. SVE performed, pt 5-6 cm. Residents notified. Dr Jessie Cross a bedside. FSE placed.

## 2023-01-25 NOTE — ANESTHESIA PROCEDURE NOTES
Epidural Block    Patient location during procedure: OB  Start time: 1/24/2023 9:00 PM  End time: 1/24/2023 9:12 PM  Reason for block: labor epidural  Staffing  Performed: resident/CRNA   Anesthesiologist: Lakeshia Laguerre MD  Resident/CRNA: MONO Chacon - CRNA  Epidural  Patient position: sitting  Prep: Betasept and site prepped and draped  Patient monitoring: continuous pulse ox and frequent blood pressure checks  Approach: midline  Location: L3-4  Injection technique: AURY saline and AURY air  Provider prep: mask and sterile gloves  Needle  Needle type: Tuohy   Needle gauge: 17 G  Needle length: 3.5 in  Needle insertion depth: 6.5 cm  Catheter type: side hole  Catheter size: 19 G  Catheter at skin depth: 14 cm  Test dose: negativeCatheter Secured: tegaderm and tape  Assessment  Hemodynamics: stable  Attempts: 1  Outcomes: uncomplicated and patient tolerated procedure well  Preanesthetic Checklist  Completed: patient identified, IV checked, site marked, risks and benefits discussed, surgical/procedural consents, equipment checked, pre-op evaluation, timeout performed, anesthesia consent given, oxygen available, monitors applied/VS acknowledged, fire risk safety assessment completed and verbalized and blood product R/B/A discussed and consented

## 2023-01-25 NOTE — CARE COORDINATION
CASE MANAGEMENT POST-PARTUM TRANSITIONAL CARE PLAN    38 weeks gestation of pregnancy [Z3A.38]  High-risk pregnancy, unspecified trimester [O09.90]    OB Provider: Dr. Lucio Veloz met w/ Mari Fiore (ENRIQUE Crain asleep on couch) at bedside to discuss DCP. She is S/P CS on 23 @ [phone] at 38w6d of female    Writer verified name/address/phone number correct on facesheet. She states she lives with her brother and mom. Emily verbalized no problems with transportation to and from doctors appointments or with paying for medications upon discharge home. [place] [facility] insurance correct. Writer notified Mari Fiore she has 30 days from date of birth to add  to insurance policy by calling [employer]. She verbalized understanding. Emily confirmed a safe place for infant to sleep at home. Infant name on BC: Yon Rangel. Infant PCP Undecided, list provided.      DME: none  HOME CARE: none    Anticipate DC of couplet 23    Readmission Risk              Risk of Unplanned Readmission:  7

## 2023-01-25 NOTE — OP NOTE
[facility]  OBSTETRICAL  PHYSICIAN POST-OPERATIVE  NOTE:      Patient Name: Doni Melendrez  Patient : 2000  [facility]  Admission Date/Time: 2023  1:46 PM  Primary Care Physician: No primary care provider on file. MRN #: [id number]  CSN #: [id number]        Date: 2023  Time: 4:47 AM        Pre-operative Diagnosis:   Doni Melendrez is a 20 y.o. female at 38w7d      Term pregnancy, Induced labor, Single fetus, and Pregnancy complicated by: see problem list  Patient Active Problem List    Diagnosis Date Noted    Abnormal GTT (glucose tolerance test) 2022     Priority: High     Overview Note:     3 hour and a1c ordered      FHx DM 2022     Priority: High     Overview Note:     Early 1 hour GTT ordered      Hx SAB x 1 2022     Priority: High    38 weeks gestation of pregnancy 2023     Priority: Medium    THC use 2022     Priority: Medium    Chlamydial infection 2021     Priority: Medium    Gonorrhea 2021     Priority: Medium       IUP@ 38w6d  See Problem List  3. IOL d/t oligohydramnios  4. Prolonged deceleration x 10 minutes. FHTs in [address] returned to baseline. Pt elected to c/w       Post-operative Diagnosis:    Living  infant(s) and Female  Same as Pre-Op        Procedures:  1.  Section- primary : Low Cervical, Transverse    2.  Abdominal Delivery of a Live Born     female      Surgeon:  Jeison Valverde DO      Assistants:  Armaan Fam M.D. PGY1      OR Staff:  David Scrub: Orly Grady  Scrub Person First: Laith Carrera      Anesthesia:  epidural    Duramorph Utilized: Yes        Estimated blood loss:  see QBL ML    Fluids:     IV: 1100 ml   Blood Products: none   Cell Saver: No    Urine Output[de-identified]  100 ml (clear)    Drains:   TYPE: Farrell  Urinary Catheter 23 (Active)         TRS Tissue Retention System Skin Retractor Utilized and placed under sterile conditions: No      Complications:  None      Findings/ Delivery Summary:  Mother's Information      Labor Events     Labor?: No  Cervical Ripening:   Now               Uma Baby Girl Emily [[id number]]      Labor Events     Labor?: No   Steroids?: None  Cervical Ripening Date/Time:       Rupture Date/Time: 23 18:38:00   Rupture Type: AROM, Intact  Fluid Color: Clear  Fluid Odor: None  Fluid Volume:  Moderate  Induction: Oxytocin, Farrell Bulb (Balloon)  Labor Complications: Fetal Intolerance       Anesthesia    Method: Epidural       Start Pushing      Labor onset date/time:   Now     Dilation complete date/time:   Now     Start pushing date/time:    Decision date/time (emergent ):           Delivery ()      Delivery Date/Time:  23 03:57:00   Delivery Type: , Low Transverse    Details:  Trial of Labor?: Yes    Categorization: Primary    Priority: Emergent   Indications for : Fetal Intolerance of Labor   Skin Incision Type: Pfannenstiel     Uterine Incision: Low Transverse              Presentation    Presentation: Vertex  Position: Left  _: Occiput  _: Anterior       Shoulder Dystocia    Shoulder Dystocia Present?: No  Add Second Maneuver  Add Third Maneuver  Add Fourth Maneuver  Add Fifth Maneuver  Add Sixth Maneuver  Add Seventh Maneuver  Add Eighth Maneuver  Add Ninth Maneuver       Assisted Delivery Details           Document Additional Attempt         Document Additional Attempt                 Cord    Vessels: 3 Vessels  Complications: None  Delayed Cord Clamping?: Yes  Cord Clamped Date/Time: 2023 03:58:00  Cord Blood Disposition: Lab  Gases Sent?: Yes       Placenta    Date/Time: 2023 03:59:34  Removal: Manual Removal  Appearance: Intact  Disposition: Pathology       Lacerations    Episiotomy: None  Perineal Lacerations: None  Other Lacerations: no non-perineal laceration       Vaginal Counts        Sponges Needles Instruments   Initial Counts      Final Counts      If the count is incorrect due to Intentionally Retained Foreign Object (IRFO) add the IRFO LDA in Lines/Drains.  Add LDA: Link to LDA       Blood Loss  Mother: Emily Eaton #[id number]     Start of Mother's Information      Delivery Blood Loss  23 0326 - 23 0447      None                 End of Mother's Information  Mother: Emily Eaton #[id number]                Delivery Providers    Delivering clinician: Va Draper,      Provider Role    Va Draper,  Obstetrician    Lucinda Reeves, SANIA Primary Nurse     Primary  Nurse    Lynn Cross, RN NICU Nurse     Anesthesiologist     Nursery Nurse    Sohan Steve MD Resident    Diana Gann,  Resident    Nishi Gates, LUKASZ Respiratory Therapist (Night)    Honey Florez APRN - CNP Nurse Practitioner    Pauline Islas, RN NICU Nurse               Assessment    Living Status: Living  Delivery Location Comment: OR 2     Apgar Scoring Key:    0 1 2    Skin Color: Blue or pale Acrocyanotic Completely pink    Heart Rate: Absent <100 bpm >100 bpm    Reflex Irritability: No response Grimace Cry or active withdrawal    Muscle Tone: Limp Some flexion Active motion    Respiratory Effort: Absent Weak cry; hypoventilation Good, crying                      Skin Color:   Heart Rate:   Reflex Irritability:   Muscle Tone:   Respiratory Effort:   Total:            1 Minute:    0    2    2    2    2    8        Apgar 1 total from OB History    5 Minute:    1    2    2    2    2    9        Apgar 5 total from OB History    10 Minute:              15 Minute:              20 Minute:                        Apgars Assigned By: NICU              Resuscitation    Method: Bulb Suction, Stimulation             [place] Measurements      Birth Weight: 2900 g Birth Length: 0.495 m              Title      Skin to Skin Initiation Date/Time:       Skin to Skin  End Date/Time:       Reason Skin to Skin Not Initiated: Maternal Acuity                      Living  infant(s) and Female    Cephalic  left occiput transverse  Other:       Amniotic Fluid was: Clear  A Nuchal Cord: was not present x 0  A Spontaneous Cry Was Noted: yes  The Baby: was suctioned        The Placenta Was Removed:  intact, whole, and that the umbilical cord had three vessels noted    cord gasses were obtained and sent to the lab, cord blood was obtained and sent to the lab, and Pitocin, 20 milliunits in 1 liter of ringers lactate was administered, wide open, to assist with uterine contraction    The umbilical cord had delayed clamping of 1 minute: Yes    The Maternal Adnexa was Visualized. There were not any adnexal masses. There is no height or weight on file to calculate BMI. (Wound Vac indicated for BMI Value>35)    Wound Vac Applied to Incision: No      Specimen:  Placenta sent to pathology yes  * No specimens in log *     Condition:  infant stable to general nursery and mother stable    Blood Type and Rh: A NEGATIVE        Rubella Immunity Status:    Rubella Antibody, IgG   Date Value Ref Range Status   2022 46.8 IU/mL Final     Comment:                 REFERENCE RANGE:  <5.0       NON-REACTIVE (non-immune)  5.0 TO 9.9 EQUIVOCAL  >=10.0     REACTIVE     (immune)                 Infant Feeding:    breast      All Sponge Counts Were Correct x 3 calls-Prior to closure of Peritoneum, Fascia, and Skin Layers: Yes        Attending Attestation: I was present and scrubbed for the entire procedure. SCIP will be utilized for Antibiotics: ancef  Allergies as of 2023    (No Known Allergies)         EPC's in  Place for DVT prophylaxis      Procedure: (Understanding of limitations from template op-reports exist)  Emily Preston is a 20 y.o. female  @ 38w6d for  delivery.  The risks, benefits, complications, alternative treatment options, and expected outcomes were discussed with the patient. Risks of surgery were discussed, including but not limited to: pain, bleeding, need for blood transfusion, infection, injury to internal organs including intestines, bladder, uterus, fallopian tubes, ovaries and rarely injury to the fetus. Postoperative complications including pain, bleeding, need for blood transfusion, infection, re-operation, infection of the incisions were also explained. The patient verbalized understanding and agreed to proceed, giving informed consent. The patient was taken to Operating Room, identified as Carmine Carr and the procedure verified as  Delivery. A Time Out was held and the above information confirmed. Procedure Details:  After Spinal Anesthetic with Duramorph was instilled in the seated position the patient was returned to the supine position with a wedge placed under her right hip. FHT's were obtained, the patient was prepped and draped in the usual sterile manner. A three minute drape delay was completed. The bladder was draining clear urine. SCIP antibiotics were infused, EPC's were in place and operating. A time out was completed. There was an adequate skin check both high and low. A Pfannenstiel incision was made with a #10 scalpel and carried down to the fascia with bovie cautery. Fascial incision was made and extended transversely. The fascia was  from the underlying rectus tissue superiorly and inferiorly. The peritoneum was identified and entered superiorly. The peritoneal incision was extended superiorly and inferiorly, care not to involve the bowel or the bladder. The Kary JASON retractor was placed inferiorly into the incision. The vesico-uterine reflection was developed and skeletonized off the lower uterine segment with blunt and sharp dissection. The [employer] retractor was reapproximated.   A low transverse uterine incision was made and the uterine cavity was entered with extreme caution with the blunt end of the scalpel. This incision was extended using digital dissection in a cephalad to caudad manner. A live female infant was delivered without complications. The head was delivered through the incision and fundal pressure was used for the remaining body of the . There was not a nuchal cord. The  had bulb suction of the mouth and nares. There was a spontaneous cry. The infant was vigorous  and there was delayed cord clamping of 1 minute. Please find the  Apgar scores and weight above in the delivery summary. The umbilical cord was then clamped and cut, the infant was handed off to the awaiting neonatology team staff member attending the delivery. Then cord specimen and cord blood was collected and the placenta was delivered using gentle traction and fundal massage, (Spontaneously), it was intact and appeared normal.  The uterus was cleared of all clots and debris and was firm and contracted. IV Pitocin was infusing. An outflow tract was confirmed. The uterine incision was closed with running locked sutures of 0 Vicryl, starting at each corner with figure of \"8's\" and moving to the midline. Excellent hemostasis was observed. Gutters were cleared of all clots and debris. The pelvis was irrigated with warm, sterile water. Uterine incision was reinspected and hemostatic. Oozing noted under bladder reflection. Surgicel powder was placed. Pressure held for 60 secnds. Irrigated with sterile water until clear. Reinspected and found to be hemostatic. The uterus, bilateral tubes and ovaries were normal.   The peritoneum was closed with 2-0 vicryl. The fascia was then reapproximated with running sutures of 0 Vicryl, starting at each corner and moving to the midline. It was checked for any defects and there were none. The subcutaneous tissue was irrigated, any bleeding sites were cauterized.     The skin was closed in a subcuticular fashion with 4-0 vicryl, then covered with tincture of benzoin and steri-strips,  It was dressed with a silver dressing  Sponge, Needle and instrument counts were called for and found to be correct prior to closure of the peritoneum, fascia, and skin layers. The urine was clear in the tubing and the bag at the end of the procedure. The patient received preoperative antibiotics and intraoperative analgesic. EPC's were in place at the beginning of the case. Patient was returned to her LDRP in stable condition. See orders.             Attending's Name: Dodie Duran DO      Physician Completing Document: Dodie Duran DO    Electronically signed by Dodie Duran DO on 1/25/2023 at 4:47 AM

## 2023-01-25 NOTE — FLOWSHEET NOTE
Rn at bedside. Pt bladder emptied. Decelerations noted, bolus started, pt placed in multiple different positions. SVE performed by [facility] RN, no changes on SVE noted. Residents notified of prolonged deceleration. Dr Gross at bedside. Fetal heart tones remained down for a total of 9 minutes. NGUYỄN called. Fetal heart tones increased to 133. Decision to proceed with  section in non emergent manner.

## 2023-01-25 NOTE — PROGRESS NOTES
Labor Progress Note    Emily Rasheed is a 20 y.o. female  at 44w7d  The patient was seen and examined. Her pain is well controlled. She reports fetal movement is present, complains of contractions, complains of loss of fluid, denies vaginal bleeding.        Vital Signs:  Vitals:    23 2315 23 2330 23 0000 23 0030   BP: 94/74 127/83 130/88 (!) 142/89   Pulse: (!) 106 98 98 (!) 112   Resp:  18 18 16   Temp:  98.7 °F (37.1 °C)     TempSrc:  Oral     SpO2: 97% 96% 96% 98%         FHT:  140 , moderate variability, accelerations present, some variable decelerations   Contractions: regular, every 2-4 minutes    Chaperone for Intimate Exam: Chaperone was present for entire exam, Chaperone Name: Stephanie Davis RN  Pitocin: stopped    Membranes: Ruptured clear fluid  Scalp Electrode in place: absent  Intrauterine Pressure Catheter in Place: present    Interventions: SVE    Assessment/Plan:  Janessa Jean is a 20 y.o. female  at 44w7d admitted for IOL 2/2 [place]   - GBS negative, No indication for GBS prophylaxis   - VSS, afebrile   - CEFM/TOCO    - AROM (clr) @ 1840   - S/p nguyen   - S/p Morphine/compazine x1   - Pitocin stopped due to variable decels   - Fluid bolus started   - Patient repositioned   - Continue to monitor        Attending updated and in agreement with plan    Berto Merino MD  Ob/Gyn Resident  2023, 12:47 AM

## 2023-01-25 NOTE — PROGRESS NOTES
SPIRITUAL CARE DEPARTMENT - Axel [facility] 82  PROGRESS NOTE    Shift date: 1/25.2023  Shift day: Tuesday   Shift # 3    Room # OBR2/OBR2-01   Name: Tommy Marin                Hinduism:    Place of Caodaism:     Referral: OB Alert    Admit Date & Time: 1/24/2023  1:46 PM    Assessment:  Tommy Marin is a 20 y.o. female in the hospital because of pregnancy. Intervention:  Writer introduced self and title as  to nurse. Nurse stated mother of the patient left the floor\"anxiously\" after the OB Alert was paged. Outcome:   remained on the floor hopeful of being of assistance to the patient, or mother. Plan:  Chaplains will remain available to offer spiritual and emotional support as needed. Electronically signed by Alfonso Mcclure on 1/25/2023 at [address] [place] [address]  [phone]   01/25/23 9309   Encounter Summary   Service Provided For: Patient; Family   Referral/Consult From: Nurse   Support System Parent   Last Encounter  01/25/23   Complexity of Encounter High   Begin Time 0327   End Time  0343   Total Time Calculated 16 min   Encounter    Type Initial Screen/Assessment   Assessment/Intervention/Outcome   Assessment Unable to assess   Intervention Sustaining Presence/Ministry of presence

## 2023-01-25 NOTE — L&D DELIVERY NOTE
Mother's Information      Labor Events     Labor?: No  Cervical Ripening:   Now               Uma, Baby Girl Emily [[id number]]      Labor Events     Labor?: No   Steroids?: None  Cervical Ripening Date/Time:       Rupture Date/Time: 23 18:38:00   Rupture Type: AROM, Intact  Fluid Color: Clear  Fluid Odor: None  Fluid Volume:  Moderate  Induction: Oxytocin, Farrell Bulb (Balloon)  Labor Complications: Fetal Intolerance       Anesthesia    Method: Epidural       Start Pushing      Labor onset date/time:   Now     Dilation complete date/time:   Now     Start pushing date/time:    Decision date/time (emergent ):           Delivery ([place])      Delivery Date/Time:  23 03:57:00   Delivery Type: , Low Transverse    Details:  Trial of Labor?: Yes    Categorization: Primary    Priority: Emergent   Indications for : Fetal Intolerance of Labor   Skin Incision Type: Pfannenstiel     Uterine Incision: Low Transverse             [place] Presentation    Presentation: Vertex  Position: Left  _: Occiput  _: Anterior       Shoulder Dystocia    Shoulder Dystocia Present?: No  Add Second Maneuver  Add Third Maneuver  Add Fourth Maneuver  Add Fifth Maneuver  Add Sixth Maneuver  Add Seventh Maneuver  Add Eighth Maneuver  Add Ninth Maneuver       Assisted Delivery Details           Document Additional Attempt         Document Additional Attempt                 Cord    Vessels: 3 Vessels  Complications: None  Delayed Cord Clamping?: Yes  Cord Clamped Date/Time: 2023 03:58:00  Cord Blood Disposition: Lab  Gases Sent?: Yes       Placenta    Date/Time: 2023 03:59:34  Removal: Manual Removal  Appearance: Intact       Lacerations    Episiotomy: None  Perineal Lacerations: None  Other Lacerations: no non-perineal laceration       Vaginal Counts        Sponges Needles Instruments   Initial Counts      Final Counts      If the count is incorrect due to Intentionally Retained Foreign Object (IRFO) add the IRFO LDA in Lines/Drains. Add LDA: Link to [facility]       Blood Loss  Mother: Surjit Oconnell #[id number]     Start of Mother's Information      Delivery Blood Loss  23 0326 - 23 0444      None                 End of Mother's Information  Mother: Surjit Oconnell #[id number]                Delivery Providers    Delivering clinician: Ervin Sahni DO     Provider Role    Ervin Sahni DO Obstetrician    Ange Membreno RN Primary Nurse     Primary  Nurse    Jeronimo Harris, RN NICU Nurse     Anesthesiologist     Nursery Nurse    Anthony Kelly MD Resident    Roxy Ivory,  Resident    Dante Newman RCP Respiratory Therapist (Night)    MONO Greer - CNP Nurse Practitioner    Avtar Machado, SANIA NICU Nurse               Assessment    Living Status: Living  Delivery Location Comment: OR 2     Apgar Scoring Key:    0 1 2    Skin Color: Blue or pale Acrocyanotic Completely pink    Heart Rate: Absent <100 bpm >100 bpm    Reflex Irritability: No response Grimace Cry or active withdrawal    Muscle Tone: Limp Some flexion Active motion    Respiratory Effort: Absent Weak cry; hypoventilation Good, crying                      Skin Color:   Heart Rate:   Reflex Irritability:   Muscle Tone:   Respiratory Effort: Total:            1 Minute:    0    2    2    2    2    8        Apgar 1 total from OB History    5 Minute:    1    2    2    2    2    9        Apgar 5 total from OB History    10 Minute:              15 Minute:              20 Minute:                        Apgars Assigned By: NICU              Resuscitation    Method: Bulb Suction, Stimulation              Measurements      Birth Weight: 2900 g Birth Length: 0.495 m              Title      Skin to Skin Initiation Date/Time:       Skin to Skin End Date/Time:       Reason Skin to Skin Not Initiated:  Maternal Acuity

## 2023-01-25 NOTE — PROGRESS NOTES
Labor Progress Note    Emily Henderson is a 20 y.o. female  at 44w7d  The patient was seen and examined. Her pain is well controlled. She reports fetal movement is present, complains of contractions, complains of loss of fluid, denies vaginal bleeding.        Vital Signs:  Vitals:    23 2130 23 2135 23 2145 23 2200   BP: 109/60 (!) 108/57 118/69 117/76   Pulse: 100 100 (!) 113 93   Resp: 18 17 16    Temp:       TempSrc:       SpO2: 97% 97% 96%          FHT:  135 , moderate variability, accelerations present, decelerations absent  Contractions: regular, every 2-3 minutes    Chaperone for Intimate Exam: Chaperone was present for entire exam, Chaperone Name: Gillian Woodard RN  Pitocin: @ 4 mu/min    Membranes: Ruptured clear fluid  Scalp Electrode in place: absent  Intrauterine Pressure Catheter in Place: present    Interventions: IUPC placed    Assessment/Plan:  Flakita Pretty is a 20 y.o. female  at 44w7d admitted for IOL 2/2 [place]   - GBS negative, No indication for GBS prophylaxis   - VSS, afebrile   - CEFM/TOCO showing cat 1   - AROM (clr) @ 1840   - S/p nguyen   - S/p Morphine/compazine x1   - pitocin per protocol      Attending updated and in agreement with plan    Edwina Khan MD  Ob/Gyn Resident  2023, 10:41 PM

## 2023-01-25 NOTE — FLOWSHEET NOTE
Pt transferred to post-partum via bed. Report given to [facility]. Fundus firm, u/u, minimal bleeding.

## 2023-01-25 NOTE — FLOWSHEET NOTE
2100 RUBEN Wall at bedside. Epidural procedure explained, risks discussed. Pt verbalizes consent for epidural.   2103 patient positioned for epidural.2105 Time out completed. 2110 catheter placed. 2111 test dose given. Epidural catheter taped and secured per anesthesia. 2115 to low fowlers with left uterine displacement. 2118 loading dose given. 2119 pump initiated. Pt tolerated procedure well.

## 2023-01-25 NOTE — CARE COORDINATION
Social Work     Sw reviewed medical record (current active problem list) and tox screens and found no current concerns.     Sw spoke with mom briefly to explain Sw role, inquire if any needs or concerns, and provide safe sleep education and discuss.  Mom denied any needs or questions and informs baby has a safe sleep environment (bassinet).     Mom denied any current s/s of anxiety or depression and is aware to reach out to OB if any s/s occur after dc.     Mom reports a good support system (fob present) and denied any current questions or needs.      Mom reports this is her 1st baby.       Mom states ped has not yet been chosen, she will need list from CM.     Sw encouraged mom to reach out if any issues or concerns arise.

## 2023-01-26 VITALS
RESPIRATION RATE: 16 BRPM | TEMPERATURE: 97.5 F | DIASTOLIC BLOOD PRESSURE: 71 MMHG | SYSTOLIC BLOOD PRESSURE: 110 MMHG | OXYGEN SATURATION: 98 % | HEART RATE: 70 BPM

## 2023-01-26 PROBLEM — Z98.891 S/P CESAREAN SECTION: Status: ACTIVE | Noted: 2023-01-26

## 2023-01-26 LAB
ABSOLUTE EOS #: 0.04 K/UL (ref 0–0.44)
ABSOLUTE IMMATURE GRANULOCYTE: 0.06 K/UL (ref 0–0.3)
ABSOLUTE LYMPH #: 2.31 K/UL (ref 1.1–3.7)
ABSOLUTE MONO #: 1.12 K/UL (ref 0.1–1.2)
BASOPHILS # BLD: 1 % (ref 0–2)
BASOPHILS ABSOLUTE: 0.06 K/UL (ref 0–0.2)
EOSINOPHILS RELATIVE PERCENT: 0 % (ref 1–4)
HCT VFR BLD CALC: 32.1 % (ref 36.3–47.1)
HEMOGLOBIN: 10.4 G/DL (ref 11.9–15.1)
IMMATURE GRANULOCYTES: 1 %
LYMPHOCYTES # BLD: 20 % (ref 24–43)
MCH RBC QN AUTO: 28.8 PG (ref 25.2–33.5)
MCHC RBC AUTO-ENTMCNC: 32.4 G/DL (ref 28.4–34.8)
MCV RBC AUTO: 88.9 FL (ref 82.6–102.9)
MONOCYTES # BLD: 10 % (ref 3–12)
NRBC AUTOMATED: 0 PER 100 WBC
PDW BLD-RTO: 14.1 % (ref 11.8–14.4)
PLATELET # BLD: 191 K/UL (ref 138–453)
PMV BLD AUTO: 12.2 FL (ref 8.1–13.5)
RBC # BLD: 3.61 M/UL (ref 3.95–5.11)
SEG NEUTROPHILS: 68 % (ref 36–65)
SEGMENTED NEUTROPHILS ABSOLUTE COUNT: 7.77 K/UL (ref 1.5–8.1)
WBC # BLD: 11.4 K/UL (ref 3.5–11.3)

## 2023-01-26 PROCEDURE — 85025 COMPLETE CBC W/AUTO DIFF WBC: CPT

## 2023-01-26 PROCEDURE — 36415 COLL VENOUS BLD VENIPUNCTURE: CPT

## 2023-01-26 PROCEDURE — 6370000000 HC RX 637 (ALT 250 FOR IP): Performed by: STUDENT IN AN ORGANIZED HEALTH CARE EDUCATION/TRAINING PROGRAM

## 2023-01-26 RX ADMIN — ACETAMINOPHEN 1000 MG: 500 TABLET ORAL at 04:03

## 2023-01-26 RX ADMIN — IBUPROFEN 800 MG: 600 TABLET, FILM COATED ORAL at 12:21

## 2023-01-26 RX ADMIN — Medication 1 TABLET: at 08:31

## 2023-01-26 RX ADMIN — IBUPROFEN 800 MG: 600 TABLET, FILM COATED ORAL at 04:02

## 2023-01-26 RX ADMIN — POLYETHYLENE GLYCOL 3350 17 G: 17 POWDER, FOR SOLUTION ORAL at 08:35

## 2023-01-26 RX ADMIN — DOCUSATE SODIUM 100 MG: 100 CAPSULE ORAL at 08:31

## 2023-01-26 RX ADMIN — ACETAMINOPHEN 1000 MG: 500 TABLET ORAL at 10:41

## 2023-01-26 ASSESSMENT — PAIN DESCRIPTION - LOCATION
LOCATION: ABDOMEN

## 2023-01-26 ASSESSMENT — PAIN SCALES - GENERAL
PAINLEVEL_OUTOF10: 6
PAINLEVEL_OUTOF10: 5
PAINLEVEL_OUTOF10: 4
PAINLEVEL_OUTOF10: 1

## 2023-01-26 ASSESSMENT — PAIN DESCRIPTION - ORIENTATION
ORIENTATION: LOWER
ORIENTATION: LOWER;MID

## 2023-01-26 ASSESSMENT — PAIN DESCRIPTION - DESCRIPTORS
DESCRIPTORS: CRAMPING;ACHING
DESCRIPTORS: DISCOMFORT
DESCRIPTORS: CRAMPING

## 2023-01-26 ASSESSMENT — PAIN - FUNCTIONAL ASSESSMENT
PAIN_FUNCTIONAL_ASSESSMENT: ACTIVITIES ARE NOT PREVENTED
PAIN_FUNCTIONAL_ASSESSMENT: ACTIVITIES ARE NOT PREVENTED

## 2023-01-26 NOTE — LACTATION NOTE
Called in to assist with feed, assisted placing baby in football hold. Baby self latched, mouth not open wide, assisted relatching baby deeper onto breast tissue. Pt states latch isn't painful, but verbalized she noticed a difference. Reviewed signs of milk transfer, hand expression. Encouraged frequent attempts and to call out as needed.

## 2023-01-26 NOTE — PROGRESS NOTES
CLINICAL PHARMACY NOTE: MEDS TO BEDS    Total # of Prescriptions Filled: 4   The following medications were delivered to the patient:  Docusate sodium 100 mg cap  Ibuprofen 800 mg tab  Acetaminophen  mg tab  Oxycodone 5 mg tab    Additional Documentation: Sadia Randle

## 2023-01-26 NOTE — LACTATION NOTE
Pt states she thinks nursing is going well, states there is no pain or pinching feeling. Reviewed deep latch, frequent attempts, and hand expression. Encouraged pt to place baby skin to skin and to call out when baby arouses.

## 2023-01-26 NOTE — PROGRESS NOTES
POST OPERATIVE DAY # 1    Emily Low is a 20 y.o. female   This patient was seen and examined today. PLTCS on 1/25/23    Her pregnancy was complicated by:   Patient Active Problem List   Diagnosis    Chlamydial infection    Gonorrhea    THC use    Hx SAB x 1    FHx DM    Abnormal GTT (glucose tolerance test)    38 weeks gestation of pregnancy    PLTCS F 1/25/23 Apg 8/9 Wt 6#6       Today she is doing well without any chief complaint. Her lochia is light. She denies chest pain, shortness of breath, headache, lightheadedness, blurred vision and peripheral edema. She is breast feeding and she denies any signs or symptoms of mastitis. She is ambulating well. She is voiding without difficulty. She currently denies S/S of postpartum depression. Flatus present. Bowel movement absent. She is tolerating solids.     Vital Signs:  Vitals:    01/25/23 1630 01/25/23 1952 01/25/23 2345 01/26/23 0347   BP: 106/68 105/62 110/73 129/77   Pulse: 68 86 85 90   Resp: 16 16 18    Temp: 97.6 °F (36.4 °C) 98.1 °F (36.7 °C) 98.1 °F (36.7 °C) 97.9 °F (36.6 °C)   TempSrc: Oral Oral Oral Oral   SpO2:  100% 99% 98%         Urine Input & Output last 24hrs:     Intake/Output Summary (Last 24 hours) at 1/26/2023 1001  Last data filed at 1/25/2023 5329  Gross per 24 hour   Intake 2685.09 ml   Output 1325 ml   Net 1360.09 ml       Physical Exam:  General:  no apparent distress, alert and cooperative  Neurologic:  alert, oriented, normal speech, no focal findings or movement disorder noted  Lungs:  No increased work of breathing, good air exchange, clear to auscultation bilaterally, no crackles or wheezing  Heart:  Regular rate and rhythm, normal S1 and S2, no S3 or S4, and no murmur noted    Abdomen: abdomen soft, non-distended, non-tender, bowel sounds present   Fundus: non-tender, firm, below umbilicus  Incision: clean, dry, and Silver dressing in place   Extremities:  no calf tenderness, non edematous    Labs:  Lab Results   Component Value Date    WBC 11.4 (H) 2023    HGB 10.4 (L) 2023    HCT 32.1 (L) 2023    MCV 88.9 2023     2023       Assessment/Plan:  Kelvin Rogers is a  POD # 1 s/p PLTCS   - Doing well, VSS    - female infant in [address]   - Encourage ambulation and use of incentive spirometer   - D/C nguyen catheter and saline lock IV on POD #1    - CBC awaiting   - Evelin/Motrin/Tylenol for pain   Rh negative/Rubella immune   - MMR not indicated    - Rhogam not indicated, baby Rh negative  Breast feeding   - Denies s/s of mastitis  Elevated BP x 1   - Denies s/s of PreE   - Has not met criteria for any hypertensive disorder of pregnancy   THC Use    - UDS negative on admission    - SW with no concerns   BMI 31   Continue post-op care. Counseling Completed:  Secondary Smoke risks and Sudden Infant Death Syndrome were reviewed with recommendations. Infant sleeping, \"back to sleep\" and avoidance of co-sleeping recommendations were reviewed. Signs and Symptoms of Post Partum Depression were reviewed. The patient is to call if any occur. Signs and symptoms of Mastitis were reviewed. The patient is to call if any occur for follow up. Discharge instructions including pelvic rest, incision care, 15 lb weight restriction, no driving with pain medicine and office follow-up were reviewed with patient     Attending Physician: Dr. Coty Jay,   Ob/Gyn Resident  2023, 5:13 AM          Attending Physician Statement  I have discussed the care of Kelvin Rogers, including pertinent history and exam findings,  with the resident. I have seen and examined the patient and the key elements of all parts of the encounter have been performed by me. I agree with the assessment, plan and orders as documented by the resident. (GC Modifier)     Pt. Seen and examined. She is up in room and doing well. States that pain is controlled with medication and bleeding is light.   Denies CP/SOB/F/CH/N/V/HA. She is ambulating, tolerating PO, and voiding without difficulty. She is requesting D/C home. Breastfeeding without concerns. Vitals:    23 1952 23 2345 23 0347 23 0800   BP: 105/62 110/73 129/77 110/71   Pulse: 86 85 90 70   Resp: 16 18 18 16   Temp: 98.1 °F (36.7 °C) 98.1 °F (36.7 °C) 97.9 °F (36.6 °C) 97.5 °F (36.4 °C)   TempSrc: Oral Oral Oral Oral   SpO2: 100% 99% 98% 98%     Recent Results (from the past 24 hour(s))   CBC auto differential    Collection Time: 23  4:24 AM   Result Value Ref Range    WBC 11.4 (H) 3.5 - 11.3 k/uL    RBC 3.61 (L) 3.95 - 5.11 m/uL    Hemoglobin 10.4 (L) 11.9 - 15.1 g/dL    Hematocrit 32.1 (L) 36.3 - 47.1 %    MCV 88.9 82.6 - 102.9 fL    MCH 28.8 25.2 - 33.5 pg    MCHC 32.4 28.4 - 34.8 g/dL    RDW 14.1 11.8 - 14.4 %    Platelets 953 639 - 339 k/uL    MPV 12.2 8.1 - 13.5 fL    NRBC Automated 0.0 0.0 per 100 WBC    Seg Neutrophils 68 (H) 36 - 65 %    Lymphocytes 20 (L) 24 - 43 %    Monocytes 10 3 - 12 %    Eosinophils % 0 (L) 1 - 4 %    Basophils 1 0 - 2 %    Immature Granulocytes 1 (H) 0 %    Segs Absolute 7.77 1.50 - 8.10 k/uL    Absolute Lymph # 2.31 1.10 - 3.70 k/uL    Absolute Mono # 1.12 0.10 - 1.20 k/uL    Absolute Eos # 0.04 0.00 - 0.44 k/uL    Basophils Absolute 0.06 0.00 - 0.20 k/uL    Absolute Immature Granulocyte 0.06 0.00 - 0.30 k/uL     POD#1 Prim C/S, girl  Rh neg - baby Rh neg, no Rhogam indicated. Breastfeeding  VSS, no concerns or complaints        [place] [address]  [phone]      Discharge Instructions for  Birth     During a  section (), an incision is made in the abdomen and uterus (womb) to deliver the baby. The normal hospital stay is 2-4 days. Steps to Take   Home Care    For the first 1-2 weeks, ask for someone to help you at home. Let people help you. Take frequent rest breaks.     For vaginal bleeding, use extra absorbent pads. Keep the incision area clean and dry. Ask your doctor about when it is safe to shower, bathe, or soak in water. Avoid heavy lifting for six weeks. Diet    After a  birth, you will start with a clear liquid diet. Examples include: Jell-o, broth, and ginger ale. If you tolerate that, you can slowly go back to your regular diet. Stay away from anything greasy or spicy right after surgery. These types of foods can upset your stomach. Drink lots of fluids to prevent constipation. Physical Activity    Do not lift anything heavier than your baby. When shifting positions, use a pillow to support the area where the incisions were made. Get up slowly. This will help you to avoid feeling dizzy or light headed. Try to move around each day. Light physical activity will help with your recovery. Ask your doctor when you will be able to go back to work. Do not drive unless your doctor has given you permission to do so. Do not drive if you are taking prescription pain medicine. Ask your doctor when you will be able to resume sexual activity. If you have not done so already, talk to your doctor about birth control options. Medications    Your doctor may recommend pain medicine to ease discomfort. If you are taking medicines, follow these general guidelines:   Take your medicine as directed. Do not change the amount or the schedule. Do not stop taking them without talking to your doctor. Do not share them. Know what the results and side effects. Report them to your doctor. Some drugs can be dangerous when mixed. Talk to a doctor or pharmacist if you are taking more than one drug. This includes over-the-counter medicine and herb or dietary supplements. Plan ahead for refills so you don't run out. Lifestyle Changes    You and your doctor will plan lifestyle changes that will help you recover.  To get encouragement and learn strategies, consider joining a support group for new mothers. Follow-up   Make a follow-up appointment as directed by your doctor. Call Your Doctor If Any of the Following Occurs   After you leave the hospital, call your doctor if any of the following occurs:   Signs of infection, including fever and chills   Heavy vaginal bleeding   Foul-smelling vaginal discharge   Excessive bleeding, redness, swelling, increasing pain or discharge from the incision site   Nausea and/or vomiting that you cannot control with the medicines you were given after surgery, or which persist for more than two days after discharge from the hospital   Pain that you cannot control with the medicines you have been given   Swelling and/or pain in one or both legs   Cough, shortness of breath, or chest pain   Joint pain, fatigue, stiffness, rash, or other new symptoms   Become dizzy or faint   If you think you have an emergency,  CALL 911  .     Francisca Nelson, DO

## 2023-01-26 NOTE — DISCHARGE INSTRUCTIONS
Follow-up with your OB doctor in 2 weeks or as specified by your physician. Please refer to A New Beginning-Your Personal Guide to Postpartum Care book provided in your room. Please take this book home with you to refer to. For Breastfeeding moms, you can contact our lactation specialist,  with any problems or questions you may have. Phone number [phone]. Feel free to leave voice mail and your call will be returned as soon as possible. DIET  Eat a well balanced diet focusing on foods high in fiber and protein. Drink 8-10 glasses of fluids daily, especially water. To avoid constipation you may take a mild stool softener as recommended by your doctor or midwife. If taking narcotics, they may constipate you. ACTIVITY  Gradually increase your activity. Resume exercise regimen only after advise by your doctor or midwife. Avoid lifting anything heavier than your baby or a gallon of milk for SIX weeks. Avoid driving 1 week for vaginal delivery and 2 weeks for  section, unless otherwise instructed by physician or if taking any pain medications. Rise slowly from a lying to sitting and then a standing position. Climb stairs carefully. Use caution when carrying your baby up and down the stairs. NO SEXUAL Activity for 6 weeks or until advised by your doctor; Nothing in vagina: intercourse, tampons, or douching. No swimming or hot tubs. Be prepared to discuss family planning at your follow-up OB visit. You may feel tired or have a lack of energy. You may continue your prenatal vitamin to replenish nutrients post delivery. Nap when baby naps to catch up on sleep. EMOTIONS  You may feel cespedes, sad, teary, & overwhelmed for the first 2 weeks postpartum. Contact your OB provider if you feel you may be showing signs of postpartum depression, or have thoughts of harming yourself or your infant.   If infant will not stop crying, contact another adult for help or place infant in their crib on their back and take a break. NEVER shake your infant. BLEEDING  Vaginal bleeding will decrease in amount over the next few weeks. You will notice that as your activity increases, your flow may increase. This is your body's way of telling you, you need take things easier and rest more often. Call your OB/ER if you are saturating one maxi pad in an hour & passing large clots. BREAST CARE  Take medications as recommended by your doctor or midwife for pain  If you develop a warm, red, tender area on your breast or develop a fever contact your lactation consultant. For breastfeeding moms:  If you become engorged, feeding may be more difficult or painful for 1-2 days. You may find it helpful to hand express some milk so that the infant can latch on more easily. While breastfeeding, continue to take your prenatal vitamins as directed by your doctor or midwife. Refer to the breastfeeding booklet in the  folder/binder for more information. For any questions or concerns contact a Lactation Consultant. Leave a message and your call will be returned. For NON-breastfeeding moms:  You may apply ice packs to your breasts over you bra for twenty minutes at a time for comfort. Cabbage leaves may be applied to breasts, replace when wilted. Avoid stimulation to your breasts, when showering allow the water to strike your back not your breasts. Do not express milk or your body will make more. Wear a good fitting bra until your milk dries, such as a sports bra. INCISIONAL CARE / LINK CARE  Shower daily, cleansing incision and perineum with mild soap. After shower pat the incision area dry and allow the area open to air. To keep the incision dry, and if necessary, you may dry it with a hair dryer on the cool setting. If used, Steri-stipes should fall off by 2 weeks. If not please remove them when you are in the shower. Do not briskly pull at strips.   If used, Staples should be removed by the OB office by 1 week. If used/ordered, an abdominal binder may provide support for your incision. Use the raghu-bottle until bleeding stops each time you use the restroom. Cleanse your perineum from front to back. If used, stitches will dissolve in 4-6 weeks. You may use a sitz bath or soak in a clean tub with drain open and water running for comfort. Kegel exercises will help restore bladder control. SWELLING  Try to keep your legs elevated when you are sitting. When lying down keep your legs elevated. CALL THE DOCTOR WITH THESE HEALTH CONCERNS OR PROBLEMS  If you have a temp of 100.4 or more. If your bleeding has increased and you are saturating a pad in an hour. Your abdomen is tender to touch. You are passing blood clots bigger than the size of a lemon. If you are experiencing extreme weakness or dizziness. If you are having flu-like symptoms such as achy muscles or joints. There is a foul smell or a green color to your vaginal bleeding. If you have pain that cannot be relieved. You have persistent burning with urination or frequency. Call if you have concerns about your well-being. You are unable to sleep, eat, or are having thoughts of harming yourself or your baby. You have swelling, bleeding, drainage, foul odor, redness, or warmth in/around your incision or stitches. You have a red, warm, tender area in you calf.

## 2023-01-26 NOTE — FLOWSHEET NOTE
Patient ordered to be dc. RN reviewed dc instructions with patient and support person, they stated understanding. Patient left with baby and support person.

## 2023-01-26 NOTE — ANESTHESIA POSTPROCEDURE EVALUATION
Department of Anesthesiology  Postprocedure Note    Patient: Jessie Recinos  MRN: [id number]  YOB: 2000  Date of evaluation: 2023      Procedure Summary     Date: 23 Room / Location: [facility] OR [address] [place] [address]    Anesthesia Start:  Anesthesia Stop: 23 0507    Procedures:        SECTION      Labor Analgesia Diagnosis:       Fetal intolerance to labor, delivered, current hospitalization      (Fetal intolerance to labor, delivered, current hospitalization [O77.9])    Surgeons: Lily Mendieta DO Responsible Provider: Lakeshia Laguerre MD    Anesthesia Type: epidural ASA Status: 2          Anesthesia Type: No value filed.     Brianna Phase I: Brianna Score: 10    Brianna Phase II:        Anesthesia Post Evaluation    Patient location during evaluation: PACU  Patient participation: complete - patient participated  Level of consciousness: awake  Pain score: 4  Nausea & Vomiting: no vomiting  Cardiovascular status: hemodynamically stable  Respiratory status: room air

## 2023-01-27 LAB — SURGICAL PATHOLOGY REPORT: NORMAL

## 2023-02-02 ENCOUNTER — OFFICE VISIT (OUTPATIENT)
Dept: OBGYN CLINIC | Age: 23
End: 2023-02-02

## 2023-02-02 VITALS
SYSTOLIC BLOOD PRESSURE: 102 MMHG | BODY MASS INDEX: 28.16 KG/M2 | WEIGHT: 153 LBS | DIASTOLIC BLOOD PRESSURE: 73 MMHG | HEIGHT: 62 IN

## 2023-02-02 PROCEDURE — 99999 PR OFFICE/OUTPT VISIT,PROCEDURE ONLY: CPT | Performed by: NURSE PRACTITIONER

## 2023-02-08 PROBLEM — Z3A.38 38 WEEKS GESTATION OF PREGNANCY: Status: RESOLVED | Noted: 2023-01-24 | Resolved: 2023-02-08

## 2023-02-09 ENCOUNTER — OFFICE VISIT (OUTPATIENT)
Dept: OBGYN CLINIC | Age: 23
End: 2023-02-09

## 2023-02-09 VITALS
BODY MASS INDEX: 27.79 KG/M2 | DIASTOLIC BLOOD PRESSURE: 72 MMHG | SYSTOLIC BLOOD PRESSURE: 100 MMHG | WEIGHT: 151 LBS | HEIGHT: 62 IN

## 2023-02-09 NOTE — PROGRESS NOTES
Sunny Sol  12:09 PM  23            The patient was seen. She has no chief complaints today. She delivered by  section on 2023. She is  breast feeding and there is not any signs or symptoms of mastitis. The patient completed the E.P.D.S. Evaluation form and scored 5. She does not have any signs or symptoms of post partum depression. She denies any suicidal thoughts with a plan, intent to harm others, and delusional ideas. Today her lochia is light she denies any dizziness or shortness of breath. Her pregnancy was complicated by:   Patient Active Problem List    Diagnosis Date Noted    Abnormal GTT (glucose tolerance test) 2022     Priority: High     Overview Note:     3 hour and a1c ordered      FHx DM 2022     Priority: High     Overview Note:     Early 1 hour GTT ordered      Hx SAB x 1 2022     Priority: High    S/P  section 2023     Priority: Medium    Encounter for postpartum visit 2023     Priority: Medium    Encounter for routine postpartum follow-up 2023     Priority: Medium    PLTCS F 23 Apg 8/9 Wt 6#6 2023     Priority: Medium    THC use 2022     Priority: Medium    Chlamydial infection 2021     Priority: Medium    Gonorrhea 2021     Priority: Medium         She does admit to having good home support. Her bowels are regular and she denies any urinary tract symptomology. OB History    Para Term  AB Living   2 1 1 0 1 1   SAB IAB Ectopic Molar Multiple Live Births   1 0 0 0 0 1           Blood pressure 100/72, height 5' 2\" (1.575 m), weight 151 lb (68.5 kg), currently breastfeeding. Abdomen: Soft and non-tender; good bowel sounds; no guarding, rebound or rigidity; no CVA tenderness bilaterally. Incision: Clean, Dry and Intact without signs or symptoms of infection. Extremities: No calf tenderness bilaterally. DTR 2/4 bilaterally. No edema.       Assessment:   Diagnosis Orders   1. Postpartum state          Chief Complaint   Patient presents with    Postpartum Care     EPDS Score of 5        Plan:  1. Return to the office in  3-4 weeks  2. Signs & Symptoms of mastitis reviewed; notify if occurs  3. Secondary smoke risks reviewed. Increased risks of respiratory problems, Sudden     infant death syndrome, and potential malignancies. 4. Abstinence  5. Family planning counseling and STD counseling completed  6. Continue with post operative restrictions  7. No lifting or [place]  8. Patient was seen with total face to face time of 20 minutes. More than 50% of this visit was on counseling and education regarding her    Diagnosis Orders   1. Postpartum state         and her options. She was also counseled on her preventative health maintenance recommendations and follow-up.

## 2023-03-09 ENCOUNTER — OFFICE VISIT (OUTPATIENT)
Dept: OBGYN CLINIC | Age: 23
End: 2023-03-09

## 2023-03-09 VITALS
SYSTOLIC BLOOD PRESSURE: 116 MMHG | WEIGHT: 159 LBS | HEIGHT: 62 IN | DIASTOLIC BLOOD PRESSURE: 70 MMHG | BODY MASS INDEX: 29.26 KG/M2

## 2023-03-09 PROBLEM — R73.09 ABNORMAL GTT (GLUCOSE TOLERANCE TEST): Status: RESOLVED | Noted: 2022-08-04 | Resolved: 2023-03-09

## 2023-03-09 NOTE — PROGRESS NOTES
Lynn Alas is a 20 y.o. female    Delivery Information:  Delivery Date: 2023    Sex of Baby: female  Type of Delivery:   Delivering Physician: dr Martín Smith Questions:  No Do you Smoke? If yes PPD is 0  Yes Do you intake caffeine? No Do you use street drugs? Yes Do you consume alcohol? No Are breast feeding? Yes Are you bottle feeding? No Are you having any problems with your breasts? (lumps, discharge, asymmetry, or redness)  No Are you having any problems with bowel movements or urination? No Are you having any vaginal discharge/itching/ or burning? Yes Are you getting help and support with the baby? No Have you had intercourse since your delivery? No If you had intercourse did you use condoms? No Have you had a menstrual cycle since delivery? Date: 0  No Do you have any questions that need to be addressed today? How long did you bleed after your delivery? 6 Weeks  What are your plans besides condoms for family planning? nexplanon  Who lives at home with you and your baby? Mother and brother     The patient was counseled on the risks of tobacco abuse and the harm it may cause to the patient and her  baby as well as others in the household from secondary smoke exposure. The patient was counseled on the risks of potential respiratory problems, cancers, and sudden infant death syndrome as well as other co-morbidities. These were discussed in detail. I reviewed cessation options and plans. The patient was counseled on smoking outdoors with appropriate covers of clothing and hair. She was counseled on hand washing prior to holding or picking up the baby. The patient had her questions answered in regards to the baby and was instructed to follow up with her pediatrician. She had reviewed with her safe sleep recommendations.     Vitals:    23 1235   BP: 116/70   Site: Right Upper Arm   Position: Sitting   Cuff Size: Medium Adult   Weight: 159 lb (72.1 kg)   Height: 5' 2\" (1.575 m)        Physical Exam:  Chaperone for Intimate Exam  Chaperone was offered and accepted as part of the rooming process. Chaperone: NA       General Appearance: This  is a well Developed, well Nourished, well groomed female. Her BMI was reviewed. Nutritional decision making was discussed. Skin:  There was a Normal Inspection of the skin without rashes or lesions. There were no rashes. (Papular, Maculopapular, Hives, Pustular, Macular)     There were no lesions (Ulcers, Erythema, Abn. Appearing Nevi)            Lymphatic:  No Lymph Nodes were Palpable in the neck , axilla or groin.  0 # Of Lymph Nodes; Location ; Character [Normal]  [Shotty] [Tender] [Enlarged]     Neck and EENT:  The neck was supple. There were no masses   The thyroid was not enlarged and had no masses. Perrla, EOMI B/L, TMI B/L No Abnormalities. Throat inspected-No exudates or Masses, Nares Patent No Masses        Respiratory: The lungs were auscultated and found to be clear. There were no rales, rhonchi or wheezes. There was a good respiratory effort. Cardiovascular: The heart was in a regular rate and rhythm. . No S3 or S4. There was no murmur appreciated. Location, grade, and radiation are not applicable. Extremities: The patients extremities were without calf tenderness, edema, or varicosities. There was full range of motion in all four extremities. Pulses in all four extremities were appreciated and are 2/4. Abdomen: The abdomen was soft and non-tender. There were good bowel sounds in all quadrants and there was no guarding, rebound or rigidity. On evaluation there was no evidence of hepatosplenomegaly and there was no costal vertebral daniel tenderness bilaterally. No hernias were appreciated. Abdominal Scars: intact    Psych:   The patient had a normal Orientation to: Time, Place, Person, and Situation  There is no Mood / Affect changes    Breast:  (Chest)  deferred  Self breast exams were reviewed in detail. Literature was given. Pelvic Exam:  deferred    Rectal Exam:  exam declined by patient          Assessment:  1. Postpartum care and examination         Family planning was discussed    Signs and symptoms of mastitis were reviewed. The patient did not have any. Signs and symptoms of post partum depression were discussed the patient did not have any. Tobacco abuse and secondary smoke risks to the mother and her  baby were reviewed. Sudden infant death syndrome was discussed. Cessation and proper protections discussed in detail. Plan:  Return in about 5 months (around 2023) for annual.   Antibiotics and decreased efficacy with birth control reviewed  Barrier recommendations and STD counseling completed  S/S mastitis reviewed      Patient was seen with total face to face time of 20 minutes. More than 50% of this visit was on counseling and education regarding her    Diagnosis Orders   1. Postpartum care and examination         and her options. She was also counseled on her preventative health maintenance recommendations and follow-up.           Electronically signed by MONO Leblanc NP on 3/9/23 at 3:07 PM EST

## 2023-10-04 ENCOUNTER — OFFICE VISIT (OUTPATIENT)
Dept: OBGYN CLINIC | Age: 23
End: 2023-10-04
Payer: COMMERCIAL

## 2023-10-04 ENCOUNTER — HOSPITAL ENCOUNTER (OUTPATIENT)
Age: 23
Setting detail: SPECIMEN
Discharge: HOME | End: 2023-10-04

## 2023-10-04 VITALS
BODY MASS INDEX: 28.71 KG/M2 | SYSTOLIC BLOOD PRESSURE: 100 MMHG | DIASTOLIC BLOOD PRESSURE: 64 MMHG | WEIGHT: 156 LBS | HEIGHT: 62 IN

## 2023-10-04 DIAGNOSIS — Z01.419 WELL FEMALE EXAM WITH ROUTINE GYNECOLOGICAL EXAM: Primary | ICD-10-CM

## 2023-10-04 DIAGNOSIS — Z11.3 SCREEN FOR STD (SEXUALLY TRANSMITTED DISEASE): ICD-10-CM

## 2023-10-04 PROCEDURE — 99395 PREV VISIT EST AGE 18-39: CPT | Performed by: NURSE PRACTITIONER

## 2023-10-04 PROCEDURE — G8484 FLU IMMUNIZE NO ADMIN: HCPCS | Performed by: NURSE PRACTITIONER

## 2023-10-04 NOTE — PROGRESS NOTES
Treatment     Answer:   None Given     Order Specific Question:   Screening or Diagnostic     Answer:   Screening     Order Specific Question:   HPV Requested? Answer:   N/A     Order Specific Question:   High Risk Patient     Answer:   N/A           The patient, Rachana Londono is a 20 y.o. female, was seen with a total time spent of 30 minutes for the visit on this date of service by the E/M provider. The time component had both face to face and non face to face time spent in determining the total time component. Counseling and education regarding her diagnosis listed below and her options regarding those diagnoses were also included in determining her time component. Diagnosis Orders   1. Well female exam with routine gynecological exam  PAP SMEAR      2. Screen for STD (sexually transmitted disease)             The patient had her preventative health maintenance recommendations and follow-up reviewed with her at the completion of her visit.

## 2023-10-05 ENCOUNTER — TELEPHONE (OUTPATIENT)
Dept: OBGYN CLINIC | Age: 23
End: 2023-10-05

## 2023-10-05 RX ORDER — METRONIDAZOLE 500 MG/1
500 TABLET ORAL 2 TIMES DAILY
Qty: 14 TABLET | Refills: 0 | Status: SHIPPED | OUTPATIENT
Start: 2023-10-05 | End: 2023-10-12

## 2023-10-05 NOTE — TELEPHONE ENCOUNTER
----- Message from MONO Queen - NP sent at 10/5/2023  4:02 PM EDT -----  + BV  Flagyl 500mg PO BID x 7 days

## 2023-10-13 LAB — CYTOLOGY REPORT: NORMAL

## 2023-10-19 ENCOUNTER — TELEPHONE (OUTPATIENT)
Dept: OBGYN CLINIC | Age: 23
End: 2023-10-19

## 2023-10-19 NOTE — TELEPHONE ENCOUNTER
----- Message from MONO Salas CNP sent at 10/16/2023  9:16 AM EDT -----  Pap smear LSIL- ASC-H- cannot exclude high grade. AGE 22  Please see if HPV can be added.   Please schedule for colposcopy   AGE 22

## 2023-10-24 NOTE — TELEPHONE ENCOUNTER
Patient notified of results and recommendations, patient needs colposcopy with , call transferred to [place] to schedule accordingly.

## 2023-12-15 ENCOUNTER — HOSPITAL ENCOUNTER (OUTPATIENT)
Age: 23
Setting detail: SPECIMEN
Discharge: HOME | End: 2023-12-15

## 2023-12-15 ENCOUNTER — PROCEDURE VISIT (OUTPATIENT)
Dept: OBGYN CLINIC | Age: 23
End: 2023-12-15

## 2023-12-15 VITALS
SYSTOLIC BLOOD PRESSURE: 98 MMHG | WEIGHT: 152 LBS | BODY MASS INDEX: 28.7 KG/M2 | HEIGHT: 61 IN | DIASTOLIC BLOOD PRESSURE: 60 MMHG

## 2023-12-15 DIAGNOSIS — R87.611 ATYPICAL SQUAMOUS CELLS CANNOT EXCLUDE HIGH GRADE SQUAMOUS INTRAEPITHELIAL LESION ON CYTOLOGIC SMEAR OF CERVIX (ASC-H): ICD-10-CM

## 2023-12-15 DIAGNOSIS — Z32.02 NEGATIVE PREGNANCY TEST: ICD-10-CM

## 2023-12-15 DIAGNOSIS — R87.612 LGSIL OF CERVIX OF UNDETERMINED SIGNIFICANCE: Primary | ICD-10-CM

## 2023-12-15 LAB
CONTROL: NORMAL
PREGNANCY TEST URINE, POC: NORMAL

## 2024-03-15 ENCOUNTER — PROCEDURE VISIT (OUTPATIENT)
Dept: OBGYN CLINIC | Age: 24
End: 2024-03-15

## 2024-03-15 ENCOUNTER — HOSPITAL ENCOUNTER (OUTPATIENT)
Age: 24
Setting detail: SPECIMEN
Discharge: HOME | End: 2024-03-15

## 2024-03-15 VITALS
WEIGHT: 150 LBS | BODY MASS INDEX: 28.32 KG/M2 | HEIGHT: 61 IN | SYSTOLIC BLOOD PRESSURE: 110 MMHG | DIASTOLIC BLOOD PRESSURE: 64 MMHG

## 2024-03-15 DIAGNOSIS — R87.612 LGSIL OF CERVIX OF UNDETERMINED SIGNIFICANCE: Primary | ICD-10-CM

## 2024-03-15 DIAGNOSIS — Z30.09 FAMILY PLANNING EDUCATION, GUIDANCE, AND COUNSELING: ICD-10-CM

## 2024-03-15 LAB
CONTROL: NORMAL
PREGNANCY TEST URINE, POC: NEGATIVE

## 2024-03-15 RX ORDER — NORETHINDRONE ACETATE AND ETHINYL ESTRADIOL 1MG-20(21)
1 KIT ORAL DAILY
Qty: 1 PACKET | Refills: 3 | Status: SHIPPED | OUTPATIENT
Start: 2024-03-15

## 2024-03-15 NOTE — PROGRESS NOTES
[facility]    COLPOSCOPY PROCEDURE FORM    Chief Complaint:   Chief Complaint   Patient presents with    Procedure         3/15/2024  Emily Eaton  Patient's last menstrual period was 2024.  23 y.o.      Past Medical History:   Diagnosis Date    Anxiety     Depression     Depression          Past Surgical History:   Procedure Laterality Date     SECTION N/A 2023     SECTION performed by Va Draper,  at [facility] OR    TONSILLECTOMY      TYMPANOSTOMY TUBE PLACEMENT         Family History   Problem Relation Age of Onset    Hypertension Maternal Grandmother     Diabetes Maternal Grandmother     Hypertension Maternal Grandfather     Heart Disease Maternal Grandfather     Diabetes Maternal Grandfather     Diabetes Mother        Social History     Socioeconomic History    Marital status: Single     Spouse name: Not on file    Number of children: Not on file    Years of education: Not on file    Highest education level: Not on file   Occupational History    Not on file   Tobacco Use    Smoking status: Never    Smokeless tobacco: Never   Vaping Use    Vaping Use: Former    Devices: Disposable   Substance and Sexual Activity    Alcohol use: Not Currently    Drug use: Not Currently     Types: Marijuana (Weed)     Comment: \"stopped 1-2mos/ago\" 9/15/2022    Sexual activity: Yes     Partners: Male   Other Topics Concern    Not on file   Social History Narrative    Not on file     Social Determinants of Health     Financial Resource Strain: Low Risk  (2022)    Overall Financial Resource Strain (CARDIA)     Difficulty of Paying Living Expenses: Not hard at all   Food Insecurity: No Food Insecurity (2022)    Hunger Vital Sign     Worried About Running Out of Food in the Last Year: Never true     Ran Out of Food in the Last Year: Never true   Transportation Needs: Not on file   Physical Activity: Not on file   Stress: Not on file   Social

## 2024-04-01 LAB — CYTOLOGY REPORT: NORMAL

## 2024-04-02 ENCOUNTER — TELEPHONE (OUTPATIENT)
Dept: OBGYN CLINIC | Age: 24
End: 2024-04-02

## 2024-04-02 RX ORDER — FLUCONAZOLE 150 MG/1
150 TABLET ORAL
Qty: 2 TABLET | Refills: 0 | Status: SHIPPED | OUTPATIENT
Start: 2024-04-02

## 2024-04-02 NOTE — TELEPHONE ENCOUNTER
----- Message from Dave Draper DO sent at 4/2/2024 11:00 AM EDT -----  Diflucan 150 mg po x 1 no rf if symptomatic from yeast    PAP with LSIL repeat 1 yr with REFLEX testing. TZ absent-If pt wishes appt to discuss LSIL diagnosis please schedule

## 2024-09-17 ENCOUNTER — OFFICE VISIT (OUTPATIENT)
Dept: OBGYN CLINIC | Age: 24
End: 2024-09-17
Payer: COMMERCIAL

## 2024-09-17 VITALS
WEIGHT: 155 LBS | HEIGHT: 61 IN | SYSTOLIC BLOOD PRESSURE: 100 MMHG | DIASTOLIC BLOOD PRESSURE: 70 MMHG | BODY MASS INDEX: 29.27 KG/M2

## 2024-09-17 DIAGNOSIS — N89.8 VAGINAL DISCHARGE: Primary | ICD-10-CM

## 2024-09-17 DIAGNOSIS — N89.8 VAGINAL ODOR: ICD-10-CM

## 2024-09-17 PROCEDURE — G8427 DOCREV CUR MEDS BY ELIG CLIN: HCPCS | Performed by: NURSE PRACTITIONER

## 2024-09-17 PROCEDURE — 1036F TOBACCO NON-USER: CPT | Performed by: NURSE PRACTITIONER

## 2024-09-17 PROCEDURE — 99213 OFFICE O/P EST LOW 20 MIN: CPT | Performed by: NURSE PRACTITIONER

## 2024-09-17 PROCEDURE — G8419 CALC BMI OUT NRM PARAM NOF/U: HCPCS | Performed by: NURSE PRACTITIONER

## 2024-09-19 ENCOUNTER — TELEPHONE (OUTPATIENT)
Dept: OBGYN CLINIC | Age: 24
End: 2024-09-19

## 2024-09-19 RX ORDER — METRONIDAZOLE 500 MG/1
500 TABLET ORAL 2 TIMES DAILY
Qty: 14 TABLET | Refills: 0 | Status: SHIPPED | OUTPATIENT
Start: 2024-09-19 | End: 2024-09-26

## 2024-09-25 ENCOUNTER — TELEPHONE (OUTPATIENT)
Dept: OBGYN CLINIC | Age: 24
End: 2024-09-25

## 2024-09-25 RX ORDER — METRONIDAZOLE 500 MG/1
500 TABLET ORAL 2 TIMES DAILY
Qty: 14 TABLET | Refills: 0 | Status: SHIPPED | OUTPATIENT
Start: 2024-09-25 | End: 2024-10-02

## (undated) DEVICE — TRAY SPNL 24GA L4IN PENCAN PNCL PNT NDL 0.75% BIPIVCAIN W/

## (undated) DEVICE — SUTURE VCRL SZ 2-0 L36IN ABSRB VLT L36MM CT-1 1/2 CIR J345H

## (undated) DEVICE — KENDALL SCD EXPRESS SLEEVES, KNEE LENGTH, MEDIUM: Brand: KENDALL SCD

## (undated) DEVICE — GLOVE SURG SZ 7 THK118MIL BLK LTX FREE POLYISOPRENE BEAD CUF

## (undated) DEVICE — TOWEL SURG W16XL26IN WHT NONFENESTRATED ST 2 PER PK